# Patient Record
Sex: FEMALE | Race: WHITE | NOT HISPANIC OR LATINO | Employment: FULL TIME | URBAN - METROPOLITAN AREA
[De-identification: names, ages, dates, MRNs, and addresses within clinical notes are randomized per-mention and may not be internally consistent; named-entity substitution may affect disease eponyms.]

---

## 2018-06-04 ENCOUNTER — HOSPITAL ENCOUNTER (OUTPATIENT)
Dept: RADIOLOGY | Facility: MEDICAL CENTER | Age: 58
End: 2018-06-04

## 2018-06-04 ENCOUNTER — APPOINTMENT (OUTPATIENT)
Dept: RADIOLOGY | Facility: MEDICAL CENTER | Age: 58
DRG: 563 | End: 2018-06-04
Attending: ORTHOPAEDIC SURGERY
Payer: COMMERCIAL

## 2018-06-04 ENCOUNTER — HOSPITAL ENCOUNTER (INPATIENT)
Facility: MEDICAL CENTER | Age: 58
LOS: 1 days | DRG: 563 | End: 2018-06-05
Attending: EMERGENCY MEDICINE | Admitting: INTERNAL MEDICINE
Payer: COMMERCIAL

## 2018-06-04 ENCOUNTER — APPOINTMENT (OUTPATIENT)
Dept: RADIOLOGY | Facility: MEDICAL CENTER | Age: 58
DRG: 563 | End: 2018-06-04
Attending: EMERGENCY MEDICINE
Payer: COMMERCIAL

## 2018-06-04 DIAGNOSIS — S42.211A CLOSED DISPLACED FRACTURE OF SURGICAL NECK OF RIGHT HUMERUS, UNSPECIFIED FRACTURE MORPHOLOGY, INITIAL ENCOUNTER: ICD-10-CM

## 2018-06-04 DIAGNOSIS — S42.291A OTHER CLOSED DISPLACED FRACTURE OF PROXIMAL END OF RIGHT HUMERUS, INITIAL ENCOUNTER: ICD-10-CM

## 2018-06-04 PROBLEM — S42.301A DISPLACED FRACTURE OF RIGHT HUMERUS: Status: ACTIVE | Noted: 2018-06-04

## 2018-06-04 PROBLEM — F10.10 ALCOHOL ABUSE: Status: ACTIVE | Noted: 2018-06-04

## 2018-06-04 PROBLEM — R74.01 TRANSAMINITIS: Status: ACTIVE | Noted: 2018-06-04

## 2018-06-04 PROBLEM — E66.9 OBESE: Status: ACTIVE | Noted: 2018-06-04

## 2018-06-04 LAB
ALBUMIN SERPL BCP-MCNC: 3.9 G/DL (ref 3.2–4.9)
ALBUMIN/GLOB SERPL: 1.5 G/DL
ALP SERPL-CCNC: 107 U/L (ref 30–99)
ALT SERPL-CCNC: 65 U/L (ref 2–50)
ANION GAP SERPL CALC-SCNC: 12 MMOL/L (ref 0–11.9)
APTT PPP: 27.6 SEC (ref 24.7–36)
AST SERPL-CCNC: 225 U/L (ref 12–45)
BASOPHILS # BLD AUTO: 0.3 % (ref 0–1.8)
BASOPHILS # BLD: 0.02 K/UL (ref 0–0.12)
BILIRUB SERPL-MCNC: 0.8 MG/DL (ref 0.1–1.5)
BUN SERPL-MCNC: 10 MG/DL (ref 8–22)
CALCIUM SERPL-MCNC: 8.3 MG/DL (ref 8.5–10.5)
CHLORIDE SERPL-SCNC: 106 MMOL/L (ref 96–112)
CO2 SERPL-SCNC: 18 MMOL/L (ref 20–33)
CREAT SERPL-MCNC: 0.54 MG/DL (ref 0.5–1.4)
EOSINOPHIL # BLD AUTO: 0 K/UL (ref 0–0.51)
EOSINOPHIL NFR BLD: 0 % (ref 0–6.9)
ERYTHROCYTE [DISTWIDTH] IN BLOOD BY AUTOMATED COUNT: 46.1 FL (ref 35.9–50)
GLOBULIN SER CALC-MCNC: 2.6 G/DL (ref 1.9–3.5)
GLUCOSE SERPL-MCNC: 126 MG/DL (ref 65–99)
HCT VFR BLD AUTO: 36.8 % (ref 37–47)
HGB BLD-MCNC: 12.3 G/DL (ref 12–16)
IMM GRANULOCYTES # BLD AUTO: 0.03 K/UL (ref 0–0.11)
IMM GRANULOCYTES NFR BLD AUTO: 0.4 % (ref 0–0.9)
INR PPP: 1.05 (ref 0.87–1.13)
LYMPHOCYTES # BLD AUTO: 0.83 K/UL (ref 1–4.8)
LYMPHOCYTES NFR BLD: 11.6 % (ref 22–41)
MAGNESIUM SERPL-MCNC: 1.7 MG/DL (ref 1.5–2.5)
MCH RBC QN AUTO: 32 PG (ref 27–33)
MCHC RBC AUTO-ENTMCNC: 33.4 G/DL (ref 33.6–35)
MCV RBC AUTO: 95.8 FL (ref 81.4–97.8)
MONOCYTES # BLD AUTO: 0.42 K/UL (ref 0–0.85)
MONOCYTES NFR BLD AUTO: 5.9 % (ref 0–13.4)
NEUTROPHILS # BLD AUTO: 5.86 K/UL (ref 2–7.15)
NEUTROPHILS NFR BLD: 81.8 % (ref 44–72)
NRBC # BLD AUTO: 0 K/UL
NRBC BLD-RTO: 0 /100 WBC
PHOSPHATE SERPL-MCNC: 3.7 MG/DL (ref 2.5–4.5)
PLATELET # BLD AUTO: 206 K/UL (ref 164–446)
PMV BLD AUTO: 9.9 FL (ref 9–12.9)
POTASSIUM SERPL-SCNC: 3.8 MMOL/L (ref 3.6–5.5)
PROT SERPL-MCNC: 6.5 G/DL (ref 6–8.2)
PROTHROMBIN TIME: 13.4 SEC (ref 12–14.6)
RBC # BLD AUTO: 3.84 M/UL (ref 4.2–5.4)
SODIUM SERPL-SCNC: 136 MMOL/L (ref 135–145)
TSH SERPL DL<=0.005 MIU/L-ACNC: 2.39 UIU/ML (ref 0.38–5.33)
WBC # BLD AUTO: 7.2 K/UL (ref 4.8–10.8)

## 2018-06-04 PROCEDURE — 71045 X-RAY EXAM CHEST 1 VIEW: CPT

## 2018-06-04 PROCEDURE — 84443 ASSAY THYROID STIM HORMONE: CPT

## 2018-06-04 PROCEDURE — 85025 COMPLETE CBC W/AUTO DIFF WBC: CPT

## 2018-06-04 PROCEDURE — 73030 X-RAY EXAM OF SHOULDER: CPT | Mod: RT

## 2018-06-04 PROCEDURE — 85730 THROMBOPLASTIN TIME PARTIAL: CPT

## 2018-06-04 PROCEDURE — 80053 COMPREHEN METABOLIC PANEL: CPT

## 2018-06-04 PROCEDURE — A9270 NON-COVERED ITEM OR SERVICE: HCPCS | Performed by: INTERNAL MEDICINE

## 2018-06-04 PROCEDURE — 700102 HCHG RX REV CODE 250 W/ 637 OVERRIDE(OP): Performed by: INTERNAL MEDICINE

## 2018-06-04 PROCEDURE — 94760 N-INVAS EAR/PLS OXIMETRY 1: CPT

## 2018-06-04 PROCEDURE — 770006 HCHG ROOM/CARE - MED/SURG/GYN SEMI*

## 2018-06-04 PROCEDURE — HZ2ZZZZ DETOXIFICATION SERVICES FOR SUBSTANCE ABUSE TREATMENT: ICD-10-PCS | Performed by: INTERNAL MEDICINE

## 2018-06-04 PROCEDURE — 96374 THER/PROPH/DIAG INJ IV PUSH: CPT

## 2018-06-04 PROCEDURE — 85610 PROTHROMBIN TIME: CPT

## 2018-06-04 PROCEDURE — 700105 HCHG RX REV CODE 258: Performed by: INTERNAL MEDICINE

## 2018-06-04 PROCEDURE — 84100 ASSAY OF PHOSPHORUS: CPT

## 2018-06-04 PROCEDURE — A9270 NON-COVERED ITEM OR SERVICE: HCPCS | Performed by: ORTHOPAEDIC SURGERY

## 2018-06-04 PROCEDURE — 700102 HCHG RX REV CODE 250 W/ 637 OVERRIDE(OP): Performed by: ORTHOPAEDIC SURGERY

## 2018-06-04 PROCEDURE — 99285 EMERGENCY DEPT VISIT HI MDM: CPT

## 2018-06-04 PROCEDURE — 83735 ASSAY OF MAGNESIUM: CPT

## 2018-06-04 PROCEDURE — 36415 COLL VENOUS BLD VENIPUNCTURE: CPT

## 2018-06-04 PROCEDURE — 99223 1ST HOSP IP/OBS HIGH 75: CPT | Performed by: INTERNAL MEDICINE

## 2018-06-04 PROCEDURE — 700111 HCHG RX REV CODE 636 W/ 250 OVERRIDE (IP): Performed by: INTERNAL MEDICINE

## 2018-06-04 PROCEDURE — 93005 ELECTROCARDIOGRAM TRACING: CPT | Performed by: EMERGENCY MEDICINE

## 2018-06-04 RX ORDER — LORAZEPAM 1 MG/1
2 TABLET ORAL
Status: DISCONTINUED | OUTPATIENT
Start: 2018-06-04 | End: 2018-06-05 | Stop reason: HOSPADM

## 2018-06-04 RX ORDER — FOLIC ACID 1 MG/1
1 TABLET ORAL DAILY
Status: DISCONTINUED | OUTPATIENT
Start: 2018-06-05 | End: 2018-06-05 | Stop reason: HOSPADM

## 2018-06-04 RX ORDER — LORAZEPAM 1 MG/1
3 TABLET ORAL
Status: DISCONTINUED | OUTPATIENT
Start: 2018-06-04 | End: 2018-06-05 | Stop reason: HOSPADM

## 2018-06-04 RX ORDER — SODIUM CHLORIDE 9 MG/ML
INJECTION, SOLUTION INTRAVENOUS CONTINUOUS
Status: DISCONTINUED | OUTPATIENT
Start: 2018-06-04 | End: 2018-06-05 | Stop reason: HOSPADM

## 2018-06-04 RX ORDER — POLYETHYLENE GLYCOL 3350 17 G/17G
1 POWDER, FOR SOLUTION ORAL
Status: DISCONTINUED | OUTPATIENT
Start: 2018-06-04 | End: 2018-06-05 | Stop reason: HOSPADM

## 2018-06-04 RX ORDER — PROMETHAZINE HYDROCHLORIDE 25 MG/1
12.5-25 SUPPOSITORY RECTAL EVERY 4 HOURS PRN
Status: DISCONTINUED | OUTPATIENT
Start: 2018-06-04 | End: 2018-06-05 | Stop reason: HOSPADM

## 2018-06-04 RX ORDER — LORAZEPAM 1 MG/1
4 TABLET ORAL
Status: DISCONTINUED | OUTPATIENT
Start: 2018-06-04 | End: 2018-06-05 | Stop reason: HOSPADM

## 2018-06-04 RX ORDER — TRAMADOL HYDROCHLORIDE 50 MG/1
25 TABLET ORAL EVERY 6 HOURS PRN
Status: DISCONTINUED | OUTPATIENT
Start: 2018-06-04 | End: 2018-06-05

## 2018-06-04 RX ORDER — PROMETHAZINE HYDROCHLORIDE 25 MG/1
12.5-25 TABLET ORAL EVERY 4 HOURS PRN
Status: DISCONTINUED | OUTPATIENT
Start: 2018-06-04 | End: 2018-06-05 | Stop reason: HOSPADM

## 2018-06-04 RX ORDER — ONDANSETRON 4 MG/1
4 TABLET, ORALLY DISINTEGRATING ORAL EVERY 4 HOURS PRN
Status: DISCONTINUED | OUTPATIENT
Start: 2018-06-04 | End: 2018-06-05 | Stop reason: HOSPADM

## 2018-06-04 RX ORDER — LORAZEPAM 2 MG/ML
1 INJECTION INTRAMUSCULAR
Status: DISCONTINUED | OUTPATIENT
Start: 2018-06-04 | End: 2018-06-05 | Stop reason: HOSPADM

## 2018-06-04 RX ORDER — ONDANSETRON 2 MG/ML
4 INJECTION INTRAMUSCULAR; INTRAVENOUS EVERY 4 HOURS PRN
Status: DISCONTINUED | OUTPATIENT
Start: 2018-06-04 | End: 2018-06-05 | Stop reason: HOSPADM

## 2018-06-04 RX ORDER — LORAZEPAM 1 MG/1
1 TABLET ORAL EVERY 4 HOURS PRN
Status: DISCONTINUED | OUTPATIENT
Start: 2018-06-04 | End: 2018-06-05 | Stop reason: HOSPADM

## 2018-06-04 RX ORDER — LORAZEPAM 2 MG/ML
1.5 INJECTION INTRAMUSCULAR
Status: DISCONTINUED | OUTPATIENT
Start: 2018-06-04 | End: 2018-06-05 | Stop reason: HOSPADM

## 2018-06-04 RX ORDER — THIAMINE MONONITRATE (VIT B1) 100 MG
100 TABLET ORAL DAILY
Status: DISCONTINUED | OUTPATIENT
Start: 2018-06-05 | End: 2018-06-05 | Stop reason: HOSPADM

## 2018-06-04 RX ORDER — ACETAMINOPHEN 325 MG/1
650 TABLET ORAL EVERY 6 HOURS PRN
Status: DISCONTINUED | OUTPATIENT
Start: 2018-06-04 | End: 2018-06-05 | Stop reason: HOSPADM

## 2018-06-04 RX ORDER — LORAZEPAM 2 MG/ML
0.5 INJECTION INTRAMUSCULAR EVERY 4 HOURS PRN
Status: DISCONTINUED | OUTPATIENT
Start: 2018-06-04 | End: 2018-06-05 | Stop reason: HOSPADM

## 2018-06-04 RX ORDER — OXYCODONE HYDROCHLORIDE 10 MG/1
10 TABLET ORAL
Status: DISCONTINUED | OUTPATIENT
Start: 2018-06-04 | End: 2018-06-04

## 2018-06-04 RX ORDER — OMEPRAZOLE 20 MG/1
20 CAPSULE, DELAYED RELEASE ORAL DAILY
Status: DISCONTINUED | OUTPATIENT
Start: 2018-06-04 | End: 2018-06-05 | Stop reason: HOSPADM

## 2018-06-04 RX ORDER — BISACODYL 10 MG
10 SUPPOSITORY, RECTAL RECTAL
Status: DISCONTINUED | OUTPATIENT
Start: 2018-06-04 | End: 2018-06-05 | Stop reason: HOSPADM

## 2018-06-04 RX ORDER — OXYCODONE HYDROCHLORIDE 5 MG/1
5 TABLET ORAL
Status: DISCONTINUED | OUTPATIENT
Start: 2018-06-04 | End: 2018-06-04

## 2018-06-04 RX ORDER — MORPHINE SULFATE 4 MG/ML
4 INJECTION, SOLUTION INTRAMUSCULAR; INTRAVENOUS
Status: DISCONTINUED | OUTPATIENT
Start: 2018-06-04 | End: 2018-06-05 | Stop reason: HOSPADM

## 2018-06-04 RX ORDER — AMOXICILLIN 250 MG
2 CAPSULE ORAL 2 TIMES DAILY
Status: DISCONTINUED | OUTPATIENT
Start: 2018-06-04 | End: 2018-06-05 | Stop reason: HOSPADM

## 2018-06-04 RX ORDER — LORAZEPAM 2 MG/ML
2 INJECTION INTRAMUSCULAR
Status: DISCONTINUED | OUTPATIENT
Start: 2018-06-04 | End: 2018-06-05 | Stop reason: HOSPADM

## 2018-06-04 RX ORDER — LORAZEPAM 1 MG/1
0.5 TABLET ORAL EVERY 4 HOURS PRN
Status: DISCONTINUED | OUTPATIENT
Start: 2018-06-04 | End: 2018-06-05 | Stop reason: HOSPADM

## 2018-06-04 RX ADMIN — SODIUM CHLORIDE: 9 INJECTION, SOLUTION INTRAVENOUS at 10:46

## 2018-06-04 RX ADMIN — SODIUM CHLORIDE: 9 INJECTION, SOLUTION INTRAVENOUS at 19:00

## 2018-06-04 RX ADMIN — PROMETHAZINE HYDROCHLORIDE 25 MG: 25 TABLET ORAL at 20:00

## 2018-06-04 RX ADMIN — MORPHINE SULFATE 4 MG: 4 INJECTION INTRAVENOUS at 10:46

## 2018-06-04 RX ADMIN — OMEPRAZOLE 20 MG: 20 CAPSULE, DELAYED RELEASE ORAL at 12:52

## 2018-06-04 RX ADMIN — ONDANSETRON 4 MG: 2 INJECTION INTRAMUSCULAR; INTRAVENOUS at 12:52

## 2018-06-04 RX ADMIN — MORPHINE SULFATE 4 MG: 4 INJECTION INTRAVENOUS at 16:56

## 2018-06-04 RX ADMIN — MORPHINE SULFATE 4 MG: 4 INJECTION INTRAVENOUS at 07:24

## 2018-06-04 RX ADMIN — PROCHLORPERAZINE EDISYLATE 5 MG: 5 INJECTION INTRAMUSCULAR; INTRAVENOUS at 20:16

## 2018-06-04 ASSESSMENT — LIFESTYLE VARIABLES
VISUAL DISTURBANCES: NOT PRESENT
AUDITORY DISTURBANCES: NOT PRESENT
AGITATION: NORMAL ACTIVITY
PAROXYSMAL SWEATS: NO SWEAT VISIBLE
SUBSTANCE_ABUSE: 1
AVERAGE NUMBER OF DAYS PER WEEK YOU HAVE A DRINK CONTAINING ALCOHOL: 7
ANXIETY: MILDLY ANXIOUS
ON A TYPICAL DAY WHEN YOU DRINK ALCOHOL HOW MANY DRINKS DO YOU HAVE: 1
HOW MANY TIMES IN THE PAST YEAR HAVE YOU HAD 5 OR MORE DRINKS IN A DAY: 0
TOTAL SCORE: 0
PAROXYSMAL SWEATS: NO SWEAT VISIBLE
NAUSEA AND VOMITING: NO NAUSEA AND NO VOMITING
NAUSEA AND VOMITING: NO NAUSEA AND NO VOMITING
HEADACHE, FULLNESS IN HEAD: NOT PRESENT
HAVE YOU EVER FELT YOU SHOULD CUT DOWN ON YOUR DRINKING: YES
NAUSEA AND VOMITING: NO NAUSEA AND NO VOMITING
HEADACHE, FULLNESS IN HEAD: NOT PRESENT
TOTAL SCORE: 1
TOTAL SCORE: 0
ORIENTATION AND CLOUDING OF SENSORIUM: ORIENTED AND CAN DO SERIAL ADDITIONS
AUDITORY DISTURBANCES: NOT PRESENT
VISUAL DISTURBANCES: NOT PRESENT
AUDITORY DISTURBANCES: NOT PRESENT
AGITATION: NORMAL ACTIVITY
EVER HAD A DRINK FIRST THING IN THE MORNING TO STEADY YOUR NERVES TO GET RID OF A HANGOVER: NO
TOTAL SCORE: 1
HEADACHE, FULLNESS IN HEAD: NOT PRESENT
AGITATION: NORMAL ACTIVITY
EVER_SMOKED: NEVER
PAROXYSMAL SWEATS: NO SWEAT VISIBLE
TREMOR: NO TREMOR
TOTAL SCORE: 1
AGITATION: NORMAL ACTIVITY
ALCOHOL_USE: YES
TOTAL SCORE: 1
ORIENTATION AND CLOUDING OF SENSORIUM: ORIENTED AND CAN DO SERIAL ADDITIONS
TREMOR: NO TREMOR
HAVE PEOPLE ANNOYED YOU BY CRITICIZING YOUR DRINKING: NO
ANXIETY: NO ANXIETY (AT EASE)
EVER FELT BAD OR GUILTY ABOUT YOUR DRINKING: NO
ORIENTATION AND CLOUDING OF SENSORIUM: ORIENTED AND CAN DO SERIAL ADDITIONS
VISUAL DISTURBANCES: NOT PRESENT
ANXIETY: MILDLY ANXIOUS
TOTAL SCORE: 1
ANXIETY: NO ANXIETY (AT EASE)
TREMOR: NO TREMOR
PAROXYSMAL SWEATS: NO SWEAT VISIBLE
NAUSEA AND VOMITING: NO NAUSEA AND NO VOMITING
HEADACHE, FULLNESS IN HEAD: NOT PRESENT
ORIENTATION AND CLOUDING OF SENSORIUM: ORIENTED AND CAN DO SERIAL ADDITIONS
CONSUMPTION TOTAL: NEGATIVE
AUDITORY DISTURBANCES: NOT PRESENT
TREMOR: NO TREMOR
VISUAL DISTURBANCES: NOT PRESENT

## 2018-06-04 ASSESSMENT — ENCOUNTER SYMPTOMS
MYALGIAS: 0
DIARRHEA: 0
FEVER: 0
LOSS OF CONSCIOUSNESS: 0
SENSORY CHANGE: 0
DIAPHORESIS: 0
WHEEZING: 0
ABDOMINAL PAIN: 0
SPUTUM PRODUCTION: 0
NECK PAIN: 0
FLANK PAIN: 0
SPEECH CHANGE: 0
SORE THROAT: 0
BRUISES/BLEEDS EASILY: 0
FALLS: 1
SEIZURES: 0
PALPITATIONS: 0
FOCAL WEAKNESS: 0
NERVOUS/ANXIOUS: 1
NAUSEA: 0
SHORTNESS OF BREATH: 0
VOMITING: 0
TINGLING: 0
CHILLS: 0
DEPRESSION: 0
BLURRED VISION: 0
DIZZINESS: 0
BACK PAIN: 0
COUGH: 0
HEADACHES: 0
BLOOD IN STOOL: 0

## 2018-06-04 ASSESSMENT — PAIN SCALES - GENERAL
PAINLEVEL_OUTOF10: 7
PAINLEVEL_OUTOF10: 4
PAINLEVEL_OUTOF10: 4
PAINLEVEL_OUTOF10: 7
PAINLEVEL_OUTOF10: 5
PAINLEVEL_OUTOF10: 5
PAINLEVEL_OUTOF10: 10
PAINLEVEL_OUTOF10: 8
PAINLEVEL_OUTOF10: 4

## 2018-06-04 ASSESSMENT — COPD QUESTIONNAIRES
COPD SCREENING SCORE: 1
HAVE YOU SMOKED AT LEAST 100 CIGARETTES IN YOUR ENTIRE LIFE: NO/DON'T KNOW
DURING THE PAST 4 WEEKS HOW MUCH DID YOU FEEL SHORT OF BREATH: NONE/LITTLE OF THE TIME
DO YOU EVER COUGH UP ANY MUCUS OR PHLEGM?: NO/ONLY WITH OCCASIONAL COLDS OR INFECTIONS

## 2018-06-04 ASSESSMENT — PAIN SCALES - WONG BAKER: WONGBAKER_NUMERICALRESPONSE: HURTS JUST A LITTLE BIT

## 2018-06-04 NOTE — PROGRESS NOTES
Dual RN Skin check completed at bedside.  Small bruise to left butt from fall.  Sling remains in place to RUE. Skin other wise intact.

## 2018-06-04 NOTE — PROGRESS NOTES
Patient seen & examined  Xrays reviewed  Comfortable in sling    Vitals:    06/04/18 0800 06/04/18 0830 06/04/18 0900 06/04/18 1053   BP:    146/83   Pulse: 76 71 94 75   Resp:    15   Temp:    37.1 °C (98.7 °F)   SpO2: 97% 96% 94% 96%   Weight:       Height:         RUE:  Sling in place  f/e wrist, digits   Sensation preserved throughout including axillary distribution  Hand w/w/p    R proximal humerus fracture    - Discussed treatment options with the patient.  We recommend non-operative management.  Can eat today  - NWB RUE in sling for comfort, OK for elbow, wrist, digit ROM.  Will start shoulder pendulums next week  - PT/OT evaluation  - pain control - patient states does better with liquid medications  - Outpatient follow-up in 1 week at [facility] with either Dr Barreto or Dr Pond

## 2018-06-04 NOTE — ASSESSMENT & PLAN NOTE
Orthopedics has been consulted  Patient is low risk for surgery, no further testing necessary  Nothing by mouth  Pain control with oxycodone and IV morphine, monitor respiratory status closely  PTOT

## 2018-06-04 NOTE — ASSESSMENT & PLAN NOTE
Patient has been counseled on diet and lifestyle modifications  Recommended outpatient weight management program and bariatric surgery evaluation

## 2018-06-04 NOTE — ED NOTES
Med rec complete per pt at bedside   Pt denies taking OTC and prescription medications   Allergies reviewed  No oral ABX within last 30 days

## 2018-06-04 NOTE — H&P
Hospital Medicine History and Physical    Date of Service  6/4/2018    Chief Complaint  Chief Complaint   Patient presents with   • Arm Injury     RT arm.        History of Presenting Illness  57 y.o. female who presented 6/4/2018 with ground-level fall and right shoulder pain. Patient states that she was drunk last night and while walking over some steps she tripped and injured her right shoulder. She has severe right shoulder pain. Pain is worse with any sort of movement. She denies any chest pain, lightheadedness or shortness of breath. She presented to an outlying facility where she underwent an x-ray that revealed a displaced comminuted right humeral fracture. Orthopedics was consulted by the ER physician. She reports drinking 5-6 drinks of hard liquor per day.       Primary Care Physician  YOEL Burks.P.NAYA.    Consultants  Ortho Dr Burch    Code Status  Full Code    Review of Systems  Review of Systems   Constitutional: Negative for chills, diaphoresis and fever.   HENT: Negative for hearing loss and sore throat.    Eyes: Negative for blurred vision.   Respiratory: Negative for cough, sputum production, shortness of breath and wheezing.    Cardiovascular: Negative for chest pain, palpitations and leg swelling.   Gastrointestinal: Negative for abdominal pain, blood in stool, diarrhea, nausea and vomiting.   Genitourinary: Negative for dysuria, flank pain and urgency.   Musculoskeletal: Positive for falls and joint pain. Negative for back pain, myalgias and neck pain.   Skin: Negative for rash.   Neurological: Negative for dizziness, tingling, sensory change, speech change, focal weakness, seizures, loss of consciousness and headaches.   Endo/Heme/Allergies: Does not bruise/bleed easily.   Psychiatric/Behavioral: Positive for substance abuse. Negative for depression and suicidal ideas. The patient is nervous/anxious.    All other systems reviewed and are negative.       Past Medical History  Past  Medical History:   Diagnosis Date   • Hypoglycemia        Surgical History  Past Surgical History:   Procedure Laterality Date   • BREAST RECONSTRUCTION Bilateral 2012    Procedure: WITH REPOSITIONING OF IMFRAMMARY FOLDS;  Surgeon: Hilario Wilhelm M.D.;  Location: [facility];  Service:    • ABDOMINOPLASTY N/A 2012    Procedure: REVERSE ;  Surgeon: Hilario Wilhelm M.D.;  Location: [facility];  Service:    • BREAST IMPLANT REVISION Bilateral 2012    Procedure: EXCHANGE TO LARGER SALINE IMPLANTS;  Surgeon: Hilario Wilhelm M.D.;  Location: [facility];  Service:    • CAPSULECTOMY Bilateral 2012    Procedure: CAPSULECTOMY;  Surgeon: Hilario Wilhelm M.D.;  Location: [facility];  Service:    • PB  DELIVERY ONLY     • CHOLECYSTECTOMY     • OTHER      cosmetic arms, legs, breast, stomach   • OTHER      ablation (uterine)       Medications  No current facility-administered medications on file prior to encounter.      Current Outpatient Prescriptions on File Prior to Encounter   Medication Sig Dispense Refill   • IRON PO Take 85 mg by mouth every day.     • Calcium Carbonate-Vitamin D (CALCIUM + D PO) Take 200 mg by mouth every day.     • magnesium gluconate 556 mg (ELEMENTAL MAG = 30 MG) 30 MG TABS Take 200 mg by mouth every day.         Family History  Family History   Problem Relation Age of Onset   • Diabetes     • Stroke         Social History  Social History   Substance Use Topics   • Smoking status: Never Smoker   • Smokeless tobacco: Never Used   • Alcohol use No       Allergies  Allergies   Allergen Reactions   • Bloodless    • Codeine         Physical Exam  Laboratory   Hemodynamics  No data recorded.      Pulse  Av.3  Min: 70  Max: 90    NIBP: 107/61      Respiratory      Pulse Oximetry: 94 %             Physical Exam   Constitutional: She is oriented to person, place, and time. She appears well-developed and  well-nourished. She appears distressed.   Obese   HENT:   Head: Normocephalic and atraumatic.   Mouth/Throat: Oropharynx is clear and moist.   Eyes: Conjunctivae are normal. Pupils are equal, round, and reactive to light.   Neck: Neck supple.   Cardiovascular: Regular rhythm and normal heart sounds.    Tachycardic   Pulmonary/Chest: Effort normal and breath sounds normal. No respiratory distress. She has no wheezes. She has no rales.   Abdominal: Soft. Bowel sounds are normal. She exhibits no distension. There is no tenderness. There is no rebound.   Musculoskeletal: She exhibits tenderness. She exhibits no edema.   Right arm in sling  Limited range of motion due to pain  Able to move fingers  Distal pulses intact   Neurological: She is alert and oriented to person, place, and time. No cranial nerve deficit. Coordination normal.   Skin: Skin is warm and dry.   Psychiatric: Her mood appears anxious.   Nursing note and vitals reviewed.      Recent Labs      06/04/18   0316   WBC  7.2   RBC  3.84*   HEMOGLOBIN  12.3   HEMATOCRIT  36.8*   MCV  95.8   MCH  32.0   MCHC  33.4*   RDW  46.1   PLATELETCT  206   MPV  9.9     Recent Labs      06/04/18   0316   SODIUM  136   POTASSIUM  3.8   CHLORIDE  106   CO2  18*   GLUCOSE  126*   BUN  10   CREATININE  0.54   CALCIUM  8.3*     Recent Labs      06/04/18   0316   ALTSGPT  65*   ASTSGOT  225*   ALKPHOSPHAT  107*   TBILIRUBIN  0.8   GLUCOSE  126*     Recent Labs      06/04/18   0316   APTT  27.6   INR  1.05             No results found for: TROPONINI  Urinalysis:  No results found for: SPECGRAVITY, GLUCOSEUR, KETONES, NITRITE, WBCURINE, RBCURINE, BACTERIA, EPITHELCELL     Imaging  DX-CHEST-PORTABLE (1 VIEW)   Final Result         1. Cardiomegaly. No pulmonary infiltrates or consolidations are noted.      2. Partially visualized right humerus fracture.      AS-EDODINJ-HMWEIAQ FILM X-RAY   Final Result      DX-SHOULDER 2+ RIGHT    (Results Pending)        Assessment/Plan     I  anticipate this patient will require at least two midnights for appropriate medical management, necessitating inpatient admission.    Displaced fracture of right humerus- (present on admission)   Assessment & Plan    Orthopedics has been consulted  Patient is low risk for surgery, no further testing necessary  Nothing by mouth  Pain control with oxycodone and IV morphine, monitor respiratory status closely  PTOT        Obese   Assessment & Plan    Patient has been counseled on diet and lifestyle modifications  Recommended outpatient weight management program and bariatric surgery evaluation          Alcohol abuse- (present on admission)   Assessment & Plan    Patient has been counseled on alcohol cessation  She is at risk for alcohol withdrawal  She will be started on CIWA protocol  She'll be started on rally bag, multivitamin, thiamine and folate  Replete electrolytes            Transaminitis- (present on admission)   Assessment & Plan    Secondary to alcohol-related liver injury  Patient has been counseled extensively on alcohol cessation and has intent to quit  Monitor CMP            VTE prophylaxis: SCD.

## 2018-06-04 NOTE — H&P
Orthopaedic Consult / H&P Note  Date: 18  Time: 620      CHIEF COMPLAINT: right humerus fracture    HISTORY OF PRESENT ILLNESS: Deja Elias is a 57 y.o. who presents with a right displaced humerus fracture following a ground level fall after hitting a closet door at home. Had immediate pain and was taken to [place] ER where she was found to have a displaced proximal humerus fracture. Transferred to [facility] for evaluation. Denies head trauma or LOC. Denies DM; patient is right handed. No back pain or radicular symptoms    Past Medical History:   Diagnosis Date   • Hypoglycemia        Past Surgical History:   Procedure Laterality Date   • BREAST RECONSTRUCTION Bilateral 2012    Procedure: WITH REPOSITIONING OF IMFRAMMARY FOLDS;  Surgeon: Hilario Wilhelm M.D.;  Location: [facility];  Service:    • ABDOMINOPLASTY N/A 2012    Procedure: REVERSE ;  Surgeon: Hilario Wilhelm M.D.;  Location: [facility];  Service:    • BREAST IMPLANT REVISION Bilateral 2012    Procedure: EXCHANGE TO LARGER SALINE IMPLANTS;  Surgeon: Hilario Wilhelm M.D.;  Location: [facility];  Service:    • CAPSULECTOMY Bilateral 2012    Procedure: CAPSULECTOMY;  Surgeon: Hilario Wilhelm M.D.;  Location: [facility];  Service:    • PB  DELIVERY ONLY     • CHOLECYSTECTOMY     • OTHER      cosmetic arms, legs, breast, stomach   • OTHER      ablation (uterine)       No current facility-administered medications on file prior to encounter.      Current Outpatient Prescriptions on File Prior to Encounter   Medication Sig Dispense Refill   • IRON PO Take 85 mg by mouth every day.     • Calcium Carbonate-Vitamin D (CALCIUM + D PO) Take 200 mg by mouth every day.     • magnesium gluconate 556 mg (ELEMENTAL MAG = 30 MG) 30 MG TABS Take 200 mg by mouth every day.         Allergies: Bloodless and Codeine    Social History     Social History   • Marital status:       Spouse name: N/A   • Number of children: N/A   • Years of education: N/A     Occupational History   • Not on file.     Social History Main Topics   • Smoking status: Never Smoker   • Smokeless tobacco: Never Used   • Alcohol use No   • Drug use: No   • Sexual activity: Not on file     Other Topics Concern   • Not on file     Social History Narrative   • No narrative on file       Family History   Problem Relation Age of Onset   • Diabetes     • Stroke         REVIEW OF SYSTEMS: Full 13-point review of systems obtained with positives   noted in the HPI above, all others negative.    PHYSICAL EXAMINATION:  Vitals:    06/04/18 0300 06/04/18 0330 06/04/18 0400 06/04/18 0442   Pulse: 78 70 81 77   SpO2: 93% 95% 92% 94%   Weight:       Height:           GENERAL: No acute distress, alert and oriented x3.  HEENT: Normocephalic, atraumatic  CARDIOVASCULAR: Regular rate  RESPIRATORY: Unlabored  ABDOMEN: Soft, nontender, nondistended.  SKIN: Some swelling/bruising, no open wound noted.   EXTREMITIES: No clubbing, cyanosis or edema, Pain in right arm with movement, other extremities have no pain with palpation, including no pain in right elbow.   MUSCULOSKELETAL: +finger flex/extend/oppose/abduct/adduct; +wrist flex/extend; SILT M/R/U/axillary    LABORATORY DATA:     Lab Results   Component Value Date/Time    WBC 7.2 06/04/2018 03:16 AM    RBC 3.84 (L) 06/04/2018 03:16 AM    HEMOGLOBIN 12.3 06/04/2018 03:16 AM    HEMATOCRIT 36.8 (L) 06/04/2018 03:16 AM        Lab Results   Component Value Date/Time    SODIUM 136 06/04/2018 03:16 AM    POTASSIUM 3.8 06/04/2018 03:16 AM    CHLORIDE 106 06/04/2018 03:16 AM    CO2 18 (L) 06/04/2018 03:16 AM    GLUCOSE 126 (H) 06/04/2018 03:16 AM    BUN 10 06/04/2018 03:16 AM    CREATININE 0.54 06/04/2018 03:16 AM        Lab Results   Component Value Date/Time    PROTHROMBTM 13.4 06/04/2018 03:16 AM    INR 1.05 06/04/2018 03:16 AM          IMAGING: outside films show displaced humeral neck  fracture;     ASSESSMENT AND PLAN: 57 year old female with displaced proximal humerus fracture following ground level fall; sent from [place] for operative intervention. Plan ORIF today with Dr. Barreto    Diet: NPO pending surgery  Weight Bearing Status-NWB RUE, keep arm in sling  DVT Prophylaxis- TEDS/SCDs, chemical held pending surgery  Antibiotics: indicated perioperatively  PT/OT  Case Coordination        Brigido Burch MD  Orthopaedic Surgeon  [facility]      Please page or call with any questions or concerns regarding this patient's care

## 2018-06-04 NOTE — PROGRESS NOTES
"Pt arrived to floor from ED. Pt A+Ox4, able to make needs known, PIV to LFA Patent. Pain 8/10 to RUE, sling in place and ice pack.   CSMT's and ARNOLD's WNL, SCD\"s inplace, Ice pack applied.  Educated pt on call light and TV remote and Incentive Spirometer 1000ml.  Mother Julia at bedside. Pt remains NPO, pt compliant with orders.  "

## 2018-06-04 NOTE — ED PROVIDER NOTES
CHIEF COMPLAINT  Chief Complaint   Patient presents with   • Arm Injury     RT arm.        HPI  Deja Elias is a 57 y.o. female who presents in transfer from [facility] for complicated humerus fracture which was reported to need surgical intervention.  Patient arrives appearing groggy but was able to relate that she took a ground-level fall when reaching up to a cabinet.  She does note that she had several drinks tonight as well.  She denies any other injuries    REVIEW OF SYSTEMS  Constitutional: No recent fevers or chills  Skin: No rashes, sun burn to lower extremities anterior surface  HEENT: No sore throat, runny nose, sores, trouble swallowing, trouble speaking.  Neck: No neck pain, stiffness, or masses.  Chest: No pain or rashes  Pulm: No shortness of breath, cough, wheezing, stridor, or pain with inspiration/expiration  Gastrointestinal: No diarrhea, constipation, bloating, melena, hematochezia or pain.  Genitourinary: No pain, urgency, frequency, dysuria, hematuria, or polyuria.   Musculoskeletal: Right shoulder pain  Neurologic: No sensory or motor changes. No confusion or disorientation.  Heme: No bleeding or bruising problems.   Immuno: No hx of recurrent infections      PAST MEDICAL HISTORY   has a past medical history of Hypoglycemia.    SOCIAL HISTORY  Social History     Social History Main Topics   • Smoking status: Never Smoker   • Smokeless tobacco: Never Used   • Alcohol use No   • Drug use: No   • Sexual activity: Not on file       SURGICAL HISTORY   has a past surgical history that includes other;  delivery only (); cholecystectomy; other; breast reconstruction (Bilateral, 2012); abdominoplasty (N/A, 2012); breast implant revision (Bilateral, 2012); and capsulectomy (Bilateral, 2012).    CURRENT MEDICATIONS  Home Medications    **Home medications have not yet been reviewed for this encounter**         ALLERGIES  Allergies   Allergen Reactions   •  "Bloodless    • Codeine        PHYSICAL EXAM  VITAL SIGNS: Pulse 70   Ht 1.6 m (5' 3\")   Wt 103 kg (227 lb)   SpO2 95%   BMI 40.21 kg/m²    Gen: Appears tired, no apparent distress, morbidly elevated body mass index  HEENT: No signs of trauma, Bilateral external ears normal, Nose normal. Conjunctiva normal, Non-icteric.   Cardiovascular: Regular rate and rhythm, no murmurs.   Thorax & Lungs: Normal breath sounds, No respiratory distress, No wheezing bilateral chest rise  Abdomen: Bowel sounds normal, Soft, No tenderness  Skin: Warm, Dry, No erythema, No rash.   Extremities: Intact distal pulses, No edema, tenderness to right shoulder, patient in shoulder immobilizing splint and wrapped.  I did not attempt to move the patient's arm due to reported injury.  Patient's hand and fingers were warm and dry.  Capillary refill was less than 3 seconds and equal to the left hand.  2+ distal pulses to radial arteries.  Patient able to wiggle fingers on affected side  Neurologic: Alert , no facial droop  Psychiatric: Affect normal, Judgment normal, Mood normal.       DIAGNOSTIC STUDIES / PROCEDURES        LABS  Results for orders placed or performed during the hospital encounter of 06/04/18   CBC WITH DIFFERENTIAL   Result Value Ref Range    WBC 7.2 4.8 - 10.8 K/uL    RBC 3.84 (L) 4.20 - 5.40 M/uL    Hemoglobin 12.3 12.0 - 16.0 g/dL    Hematocrit 36.8 (L) 37.0 - 47.0 %    MCV 95.8 81.4 - 97.8 fL    MCH 32.0 27.0 - 33.0 pg    MCHC 33.4 (L) 33.6 - 35.0 g/dL    RDW 46.1 35.9 - 50.0 fL    Platelet Count 206 164 - 446 K/uL    MPV 9.9 9.0 - 12.9 fL    Neutrophils-Polys 81.80 (H) 44.00 - 72.00 %    Lymphocytes 11.60 (L) 22.00 - 41.00 %    Monocytes 5.90 0.00 - 13.40 %    Eosinophils 0.00 0.00 - 6.90 %    Basophils 0.30 0.00 - 1.80 %    Immature Granulocytes 0.40 0.00 - 0.90 %    Nucleated RBC 0.00 /100 WBC    Neutrophils (Absolute) 5.86 2.00 - 7.15 K/uL    Lymphs (Absolute) 0.83 (L) 1.00 - 4.80 K/uL    Monos (Absolute) 0.42 0.00 - " 0.85 K/uL    Eos (Absolute) 0.00 0.00 - 0.51 K/uL    Baso (Absolute) 0.02 0.00 - 0.12 K/uL    Immature Granulocytes (abs) 0.03 0.00 - 0.11 K/uL    NRBC (Absolute) 0.00 K/uL   COMP METABOLIC PANEL   Result Value Ref Range    Sodium 136 135 - 145 mmol/L    Potassium 3.8 3.6 - 5.5 mmol/L    Chloride 106 96 - 112 mmol/L    Co2 18 (L) 20 - 33 mmol/L    Anion Gap 12.0 (H) 0.0 - 11.9    Glucose 126 (H) 65 - 99 mg/dL    Bun 10 8 - 22 mg/dL    Creatinine 0.54 0.50 - 1.40 mg/dL    Calcium 8.3 (L) 8.5 - 10.5 mg/dL    AST(SGOT) 225 (H) 12 - 45 U/L    ALT(SGPT) 65 (H) 2 - 50 U/L    Alkaline Phosphatase 107 (H) 30 - 99 U/L    Total Bilirubin 0.8 0.1 - 1.5 mg/dL    Albumin 3.9 3.2 - 4.9 g/dL    Total Protein 6.5 6.0 - 8.2 g/dL    Globulin 2.6 1.9 - 3.5 g/dL    A-G Ratio 1.5 g/dL   APTT   Result Value Ref Range    APTT 27.6 24.7 - 36.0 sec   PROTHROMBIN TIME (INR)   Result Value Ref Range    PT 13.4 12.0 - 14.6 sec    INR 1.05 0.87 - 1.13   ESTIMATED GFR   Result Value Ref Range    GFR If African American >60 >60 mL/min/1.73 m 2    GFR If Non African American >60 >60 mL/min/1.73 m 2   EKG (NOW)   Result Value Ref Range    Report       [facility]    Test Date:  2018  Pt Name:    HUMBLE CARLOS                  Department: ER  MRN:        [id number]                      Room:       BL 21  Gender:     Female                       Technician: [id number]  :        [id number]                   Requested By:JAMIN MARTELL  Order #:    [id number]                    Reading MD:    Measurements  Intervals                                Axis  Rate:       76                           P:          46  WV:         188                          QRS:        6  QRSD:       88                           T:          11  QT:         400  QTc:        450    Interpretive Statements  SINUS RHYTHM  BORDERLINE T ABNORMALITIES, ANTERIOR LEADS  No previous ECG available for comparison         RADIOLOGY  DX-CHEST-PORTABLE (1  VIEW)   Final Result         1. Cardiomegaly. No pulmonary infiltrates or consolidations are noted.      2. Partially visualized right humerus fracture.      OK-LBDANDS-[id number] FILM X-RAY   Final Result        Reevaluation   Time:3:00 AM  Vital signs:   Assessment: Patient discussed with the on-call orthopedic surgeon her who asked that the patient be admitted to the hospital service and made n.p.o. for likely surgery tomorrow morning or afternoon.    COURSE & MEDICAL DECISION MAKING  Pertinent Labs & Imaging studies reviewed. (See chart for details)  Patient arrives with a rather comminuted, displaced right proximal humerus fracture after ground-level fall.  This appears to be an isolated injury and the patient is otherwise healthy.  The patient will be admitted to the hospital service for medical treatment and will likely go to the OR later today.  It is notable that the patient's LFTs are abnormal indicating a more chronic alcohol problem.    FINAL IMPRESSION  1.  Right proximal humerus fracture, comminuted, closed  2.  Elevated LFTs  3.         Electronically signed by: Mendez Jim, 6/4/2018 2:30 AM

## 2018-06-04 NOTE — ASSESSMENT & PLAN NOTE
Patient has been counseled on alcohol cessation  She is at risk for alcohol withdrawal  She will be started on CIWA protocol  She'll be started on rally bag, multivitamin, thiamine and folate  Replete electrolytes

## 2018-06-04 NOTE — ED TRIAGE NOTES
Deja Elias  Pt bib EMS. Pt changed into gown and placed on monitor.     Chief Complaint   Patient presents with   • Arm Injury     RT arm.      Pt transferred from [facility] for Ortho consult and higher level of care. Per EMS, pt had mechanical GLF around 2200 last night. Pt c/o right arm and shoulder pain. Pt diagnosed with comminuted fracture of the right surgical neck proximal to the humerus.   -LOC - neck/back pain +CMS.   Chart up and ready for ERP now.

## 2018-06-04 NOTE — ASSESSMENT & PLAN NOTE
Secondary to alcohol-related liver injury  Patient has been counseled extensively on alcohol cessation and has intent to quit  Monitor CMP

## 2018-06-04 NOTE — ED NOTES
Pt request more pain medication, Said it's still working but she's still in some pain, RN explained respiratory depression concern of giving meds too frequently and ordered time frame Q3H. Pt agreed as she needed 2L O2 nasal cannula due to O2 depressed to 85%-88% when pt relaxed.

## 2018-06-05 VITALS
TEMPERATURE: 98.5 F | DIASTOLIC BLOOD PRESSURE: 70 MMHG | RESPIRATION RATE: 16 BRPM | HEART RATE: 68 BPM | OXYGEN SATURATION: 98 % | BODY MASS INDEX: 40.22 KG/M2 | HEIGHT: 63 IN | SYSTOLIC BLOOD PRESSURE: 119 MMHG | WEIGHT: 227 LBS

## 2018-06-05 LAB
BASOPHILS # BLD AUTO: 0.4 % (ref 0–1.8)
BASOPHILS # BLD: 0.02 K/UL (ref 0–0.12)
EOSINOPHIL # BLD AUTO: 0.08 K/UL (ref 0–0.51)
EOSINOPHIL NFR BLD: 1.6 % (ref 0–6.9)
ERYTHROCYTE [DISTWIDTH] IN BLOOD BY AUTOMATED COUNT: 48.6 FL (ref 35.9–50)
HCT VFR BLD AUTO: 35 % (ref 37–47)
HGB BLD-MCNC: 11.2 G/DL (ref 12–16)
IMM GRANULOCYTES # BLD AUTO: 0.01 K/UL (ref 0–0.11)
IMM GRANULOCYTES NFR BLD AUTO: 0.2 % (ref 0–0.9)
LYMPHOCYTES # BLD AUTO: 1.47 K/UL (ref 1–4.8)
LYMPHOCYTES NFR BLD: 29.2 % (ref 22–41)
MCH RBC QN AUTO: 31.6 PG (ref 27–33)
MCHC RBC AUTO-ENTMCNC: 32 G/DL (ref 33.6–35)
MCV RBC AUTO: 98.9 FL (ref 81.4–97.8)
MONOCYTES # BLD AUTO: 0.55 K/UL (ref 0–0.85)
MONOCYTES NFR BLD AUTO: 10.9 % (ref 0–13.4)
NEUTROPHILS # BLD AUTO: 2.91 K/UL (ref 2–7.15)
NEUTROPHILS NFR BLD: 57.7 % (ref 44–72)
NRBC # BLD AUTO: 0 K/UL
NRBC BLD-RTO: 0 /100 WBC
PLATELET # BLD AUTO: 195 K/UL (ref 164–446)
PMV BLD AUTO: 9.7 FL (ref 9–12.9)
RBC # BLD AUTO: 3.54 M/UL (ref 4.2–5.4)
WBC # BLD AUTO: 5 K/UL (ref 4.8–10.8)

## 2018-06-05 PROCEDURE — 700102 HCHG RX REV CODE 250 W/ 637 OVERRIDE(OP): Performed by: ORTHOPAEDIC SURGERY

## 2018-06-05 PROCEDURE — G8979 MOBILITY GOAL STATUS: HCPCS | Mod: CJ

## 2018-06-05 PROCEDURE — 700102 HCHG RX REV CODE 250 W/ 637 OVERRIDE(OP): Performed by: HOSPITALIST

## 2018-06-05 PROCEDURE — 700105 HCHG RX REV CODE 258: Performed by: INTERNAL MEDICINE

## 2018-06-05 PROCEDURE — G8987 SELF CARE CURRENT STATUS: HCPCS | Mod: CJ

## 2018-06-05 PROCEDURE — 700102 HCHG RX REV CODE 250 W/ 637 OVERRIDE(OP): Performed by: INTERNAL MEDICINE

## 2018-06-05 PROCEDURE — G8978 MOBILITY CURRENT STATUS: HCPCS | Mod: CJ

## 2018-06-05 PROCEDURE — 97535 SELF CARE MNGMENT TRAINING: CPT

## 2018-06-05 PROCEDURE — A9270 NON-COVERED ITEM OR SERVICE: HCPCS | Performed by: INTERNAL MEDICINE

## 2018-06-05 PROCEDURE — 36415 COLL VENOUS BLD VENIPUNCTURE: CPT

## 2018-06-05 PROCEDURE — 85025 COMPLETE CBC W/AUTO DIFF WBC: CPT

## 2018-06-05 PROCEDURE — 700111 HCHG RX REV CODE 636 W/ 250 OVERRIDE (IP): Performed by: INTERNAL MEDICINE

## 2018-06-05 PROCEDURE — 97161 PT EVAL LOW COMPLEX 20 MIN: CPT

## 2018-06-05 PROCEDURE — A9270 NON-COVERED ITEM OR SERVICE: HCPCS | Performed by: ORTHOPAEDIC SURGERY

## 2018-06-05 PROCEDURE — G8989 SELF CARE D/C STATUS: HCPCS | Mod: CJ

## 2018-06-05 PROCEDURE — 97165 OT EVAL LOW COMPLEX 30 MIN: CPT

## 2018-06-05 PROCEDURE — A9270 NON-COVERED ITEM OR SERVICE: HCPCS | Performed by: HOSPITALIST

## 2018-06-05 PROCEDURE — 99239 HOSP IP/OBS DSCHRG MGMT >30: CPT | Performed by: HOSPITALIST

## 2018-06-05 PROCEDURE — G8988 SELF CARE GOAL STATUS: HCPCS | Mod: CJ

## 2018-06-05 PROCEDURE — G8980 MOBILITY D/C STATUS: HCPCS | Mod: CJ

## 2018-06-05 RX ORDER — ONDANSETRON 4 MG/1
4 TABLET, FILM COATED ORAL EVERY 4 HOURS PRN
Qty: 20 TAB | Refills: 1 | Status: SHIPPED | OUTPATIENT
Start: 2018-06-05 | End: 2018-06-05

## 2018-06-05 RX ORDER — HYDROCODONE BITARTRATE AND ACETAMINOPHEN 5; 325 MG/1; MG/1
1-2 TABLET ORAL EVERY 6 HOURS PRN
Qty: 40 TAB | Refills: 0 | Status: SHIPPED | OUTPATIENT
Start: 2018-06-05 | End: 2018-06-05

## 2018-06-05 RX ORDER — HYDROCODONE BITARTRATE AND ACETAMINOPHEN 5; 325 MG/1; MG/1
1-2 TABLET ORAL EVERY 6 HOURS PRN
Status: DISCONTINUED | OUTPATIENT
Start: 2018-06-05 | End: 2018-06-05 | Stop reason: HOSPADM

## 2018-06-05 RX ORDER — HYDROCODONE BITARTRATE AND ACETAMINOPHEN 5; 325 MG/1; MG/1
1-2 TABLET ORAL EVERY 6 HOURS PRN
Qty: 40 TAB | Refills: 0 | Status: SHIPPED | OUTPATIENT
Start: 2018-06-05 | End: 2018-06-19

## 2018-06-05 RX ORDER — LANOLIN ALCOHOL/MO/W.PET/CERES
100 CREAM (GRAM) TOPICAL DAILY
Qty: 30 TAB | Refills: 0 | Status: SHIPPED | OUTPATIENT
Start: 2018-06-05 | End: 2024-03-05

## 2018-06-05 RX ORDER — ONDANSETRON 4 MG/1
4 TABLET, FILM COATED ORAL EVERY 4 HOURS PRN
Qty: 20 TAB | Refills: 1 | Status: SHIPPED | OUTPATIENT
Start: 2018-06-05 | End: 2024-03-05

## 2018-06-05 RX ORDER — FOLIC ACID 1 MG/1
1 TABLET ORAL DAILY
Qty: 30 TAB | Refills: 0 | Status: SHIPPED | OUTPATIENT
Start: 2018-06-05 | End: 2024-03-05

## 2018-06-05 RX ADMIN — ONDANSETRON 4 MG: 4 TABLET, ORALLY DISINTEGRATING ORAL at 09:47

## 2018-06-05 RX ADMIN — OMEPRAZOLE 20 MG: 20 CAPSULE, DELAYED RELEASE ORAL at 09:53

## 2018-06-05 RX ADMIN — STANDARDIZED SENNA CONCENTRATE AND DOCUSATE SODIUM 2 TABLET: 8.6; 5 TABLET, FILM COATED ORAL at 09:54

## 2018-06-05 RX ADMIN — THERA TABS 1 TABLET: TAB at 09:53

## 2018-06-05 RX ADMIN — MORPHINE SULFATE 4 MG: 4 INJECTION INTRAVENOUS at 07:08

## 2018-06-05 RX ADMIN — Medication 100 MG: at 09:53

## 2018-06-05 RX ADMIN — TRAMADOL HYDROCHLORIDE 25 MG: 50 TABLET, COATED ORAL at 04:11

## 2018-06-05 RX ADMIN — FOLIC ACID 1 MG: 1 TABLET ORAL at 09:53

## 2018-06-05 RX ADMIN — PROCHLORPERAZINE EDISYLATE 5 MG: 5 INJECTION INTRAMUSCULAR; INTRAVENOUS at 03:50

## 2018-06-05 RX ADMIN — SODIUM CHLORIDE: 9 INJECTION, SOLUTION INTRAVENOUS at 02:34

## 2018-06-05 RX ADMIN — HYDROCODONE BITARTRATE AND ACETAMINOPHEN 1 TABLET: 5; 325 TABLET ORAL at 09:53

## 2018-06-05 ASSESSMENT — COGNITIVE AND FUNCTIONAL STATUS - GENERAL
MOVING TO AND FROM BED TO CHAIR: UNABLE
CLIMB 3 TO 5 STEPS WITH RAILING: A LITTLE
PERSONAL GROOMING: A LITTLE
TOILETING: A LITTLE
EATING MEALS: A LITTLE
MOVING FROM LYING ON BACK TO SITTING ON SIDE OF FLAT BED: A LITTLE
WALKING IN HOSPITAL ROOM: A LITTLE
DRESSING REGULAR LOWER BODY CLOTHING: A LOT
DAILY ACTIVITIY SCORE: 16
DRESSING REGULAR UPPER BODY CLOTHING: A LITTLE
SUGGESTED CMS G CODE MODIFIER DAILY ACTIVITY: CK
MOBILITY SCORE: 15
HELP NEEDED FOR BATHING: A LOT
TURNING FROM BACK TO SIDE WHILE IN FLAT BAD: A LOT
STANDING UP FROM CHAIR USING ARMS: A LITTLE
SUGGESTED CMS G CODE MODIFIER MOBILITY: CK

## 2018-06-05 ASSESSMENT — PAIN SCALES - GENERAL
PAINLEVEL_OUTOF10: 7
PAINLEVEL_OUTOF10: 5
PAINLEVEL_OUTOF10: 5
PAINLEVEL_OUTOF10: 6
PAINLEVEL_OUTOF10: 7
PAINLEVEL_OUTOF10: 5

## 2018-06-05 ASSESSMENT — LIFESTYLE VARIABLES
NAUSEA AND VOMITING: NO NAUSEA AND NO VOMITING
TOTAL SCORE: 1
NAUSEA AND VOMITING: NO NAUSEA AND NO VOMITING
ORIENTATION AND CLOUDING OF SENSORIUM: ORIENTED AND CAN DO SERIAL ADDITIONS
TREMOR: NO TREMOR
VISUAL DISTURBANCES: NOT PRESENT
HEADACHE, FULLNESS IN HEAD: NOT PRESENT
HEADACHE, FULLNESS IN HEAD: NOT PRESENT
VISUAL DISTURBANCES: NOT PRESENT
AUDITORY DISTURBANCES: NOT PRESENT
PAROXYSMAL SWEATS: NO SWEAT VISIBLE
PAROXYSMAL SWEATS: NO SWEAT VISIBLE
AGITATION: NORMAL ACTIVITY
ORIENTATION AND CLOUDING OF SENSORIUM: ORIENTED AND CAN DO SERIAL ADDITIONS
TREMOR: NO TREMOR
AUDITORY DISTURBANCES: NOT PRESENT
ANXIETY: MILDLY ANXIOUS
ANXIETY: NO ANXIETY (AT EASE)
AGITATION: NORMAL ACTIVITY
TOTAL SCORE: 0

## 2018-06-05 ASSESSMENT — ACTIVITIES OF DAILY LIVING (ADL): TOILETING: INDEPENDENT

## 2018-06-05 ASSESSMENT — GAIT ASSESSMENTS
DISTANCE (FEET): 150
ASSISTIVE DEVICE: SINGLE POINT CANE
DEVIATION: BRADYKINETIC
GAIT LEVEL OF ASSIST: STAND BY ASSIST

## 2018-06-05 NOTE — DISCHARGE INSTRUCTIONS
Discharge Instructions  Non-weight bearing to your Right shoulder, in sling for comfort, OK for elbow, wrist, digit Range of Motion  Diet regular with 9-13 servings of fruits and vegetables   Activities as tolerated   Follow ups with your primary care physician in 7-10 days, call for appointment   No smoking, no alcohol, no caffeine   Wear seat belt in motorized vehicle   Take all of your medications as perscribed   Keep appointments. FOllow up with Dr. Barreto or Dr. Pond in ! Week at the [facility]  If symptoms worsen call PCP, 911 or urgent care.N          Discharged to home by car with relative. Discharged via wheelchair, hospital escort: Yes.  Special equipment needed: Not Applicable    Be sure to schedule a follow-up appointment with your primary care doctor or any specialists as instructed.     Discharge Plan:   Influenza Vaccine Indication: Patient Refuses    I understand that a diet low in cholesterol, fat, and sodium is recommended for good health. Unless I have been given specific instructions below for another diet, I accept this instruction as my diet prescription.   Other diet: Regular as tolerated    Special Instructions: Discharge instructions for the Orthopedic Patient    Follow up with Primary Care Physician within 2 weeks of discharge to home, regarding:  Review of medications and diagnostic testing.  Surveillance for medical complications.  Workup and treatment of osteoporosis, if appropriate.     -Is this a Joint Replacement patient? No    -Is this patient being discharged with medication to prevent blood clots?  No    · Is patient discharged on Warfarin / Coumadin?   No     Humerus Fracture Treated With ORIF, Care After  Refer to this sheet in the next few weeks. These instructions provide you with information about caring for yourself after your procedure. Your health care provider may also give you more specific instructions. Your treatment has been planned according to  current medical practices, but problems sometimes occur. Call your health care provider if you have any problems or questions after your procedure.  WHAT TO EXPECT AFTER THE PROCEDURE  After your procedure, it is common to have:  · Pain.  · Swelling.  · Stiffness.  HOME CARE INSTRUCTIONS  If You Have a Shoulder Immobilizer or a Sling:  · Wear it as directed by your health care provider. Remove it only as directed by your health care provider.  Bathing  · Keep the bandage (dressing) dry until your health care provider says it can be removed.  · Take sponge baths only. Ask the surgeon when you can start showering or taking a bath.  Incision Care  · There are many different ways to close and cover an incision, including stitches, skin glue, and adhesive strips. Follow instructions from your health care provider about:  ¨ Incision care.  ¨ Dressing changes and removal.  ¨ Incision closure removal.  · Check your incision area every day for signs of infection. Watch for:  ¨ Redness, swelling, or pain.  ¨ Fluid, blood, or pus.  Managing Pain, Stiffness, and Swelling  · If directed, apply ice to the injured area.  ¨ Put ice in a plastic bag.  ¨ Place a towel between your skin and the bag.  ¨ Leave the ice on for 20 minutes, 2-3 times per day.  · Move your fingers often to avoid stiffness and to lessen swelling.  · If directed by your health care provider, raise the injured area above the level of your heart while you are sitting or lying down.  Driving  · Do not drive or operate heavy machinery while taking pain medicine.  · Do not drive while wearing a sling or a shoulder immobilizer.  Activity  · Return to your normal activities as directed by your health care provider. Ask your health care provider what activities are safe for you.  · Avoid lifting until your health care provider approves.  · Perform range-of-motion exercises only as directed by your health care provider.  General Instructions  · Do not use any tobacco  products, including cigarettes, chewing tobacco, or electronic cigarettes. Tobacco can delay bone healing. If you need help quitting, ask your health care provider.  · Take medicines only as directed by your health care provider.  · Keep all follow-up visits as directed by your health care provider. This is important.  SEEK MEDICAL CARE IF:  · You have pain that is not helped by medicine.  · You have a fever.  · You have redness, swelling, or pain at the site of your incision.  · You have fluid, blood, or pus coming from your incision site.  · You feel faint or light-headed.  · You develop a rash.  SEEK IMMEDIATE MEDICAL CARE IF:   · You have trouble breathing.  · You have numbness or tingling in your hand or fingers.     This information is not intended to replace advice given to you by your health care provider. Make sure you discuss any questions you have with your health care provider.     Document Released: 07/07/2006 Document Revised: 05/03/2016 Document Reviewed: 11/11/2015  CuÃ­date Interactive Patient Education ©2016 [employer]    Acetaminophen; Hydrocodone tablets or capsules  What is this medicine?  ACETAMINOPHEN; HYDROCODONE (a set a JANE stephen fen; rina droe KOE done) is a pain reliever. It is used to treat moderate to severe pain.  This medicine may be used for other purposes; ask your health care provider or pharmacist if you have questions.  COMMON BRAND NAME(S): Anexsia, Bancap HC, Ceta-Plus, Co-Gesic, Comfortpak, Dolagesic, Dolorex Forte, DuoCet, Hydrocet, Hydrogesic, Lorcet, Lorcet HD, Lorcet Plus, Lortab, Margesic H, Maxidone, Norco, Polygesic, Stagesic, Vanacet, Verdrocet, Vicodin, Vicodin ES, Vicodin HP, Xodol, Zydone  What should I tell my health care provider before I take this medicine?  They need to know if you have any of these conditions:  -brain tumor  -Crohn's disease, inflammatory bowel disease, or ulcerative colitis  -drug abuse or addiction  -head injury  -heart or circulation  problems  -if you often drink alcohol  -kidney disease or problems going to the bathroom  -liver disease  -lung disease, asthma, or breathing problems  -an unusual or allergic reaction to acetaminophen, hydrocodone, other opioid analgesics, other medicines, foods, dyes, or preservatives  -pregnant or trying to get pregnant  -breast-feeding  How should I use this medicine?  Take this medicine by mouth with a glass of water. Follow the directions on the prescription label. You can take it with or without food. If it upsets your stomach, take it with food. Do not take your medicine more often than directed.  A special MedGuide will be given to you by the pharmacist with each prescription and refill. Be sure to read this information carefully each time.  Talk to your pediatrician regarding the use of this medicine in children. Special care may be needed.  Overdosage: If you think you have taken too much of this medicine contact a poison control center or emergency room at once.  NOTE: This medicine is only for you. Do not share this medicine with others.  What if I miss a dose?  If you miss a dose, take it as soon as you can. If it is almost time for your next dose, take only that dose. Do not take double or extra doses.  What may interact with this medicine?  This medicine may interact with the following medications:  -alcohol  -antiviral medicines for HIV or AIDS  -atropine  -antihistamines for allergy, cough and cold  -certain antibiotics like erythromycin, clarithromycin  -certain medicines for anxiety or sleep  -certain medicines for bladder problems like oxybutynin, tolterodine  -certain medicines for depression like amitriptyline, fluoxetine, sertraline  -certain medicines for fungal infections like ketoconazole and itraconazole  -certain medicines for Parkinson's disease like benztropine, trihexyphenidyl  -certain medicines for seizures like carbamazepine, phenobarbital, phenytoin, primidone  -certain medicines  for stomach problems like dicyclomine, hyoscyamine  -certain medicines for travel sickness like scopolamine  -general anesthetics like halothane, isoflurane, methoxyflurane, propofol  -ipratropium  -local anesthetics like lidocaine, pramoxine, tetracaine  -MAOIs like Carbex, Eldepryl, Marplan, Nardil, and Parnate  -medicines that relax muscles for surgery  -other medicines with acetaminophen  -other narcotic medicines for pain or cough  -phenothiazines like chlorpromazine, mesoridazine, prochlorperazine, thioridazine  -rifampin  This list may not describe all possible interactions. Give your health care provider a list of all the medicines, herbs, non-prescription drugs, or dietary supplements you use. Also tell them if you smoke, drink alcohol, or use illegal drugs. Some items may interact with your medicine.  What should I watch for while using this medicine?  Tell your doctor or health care professional if your pain does not go away, if it gets worse, or if you have new or a different type of pain. You may develop tolerance to the medicine. Tolerance means that you will need a higher dose of the medicine for pain relief. Tolerance is normal and is expected if you take the medicine for a long time.  Do not suddenly stop taking your medicine because you may develop a severe reaction. Your body becomes used to the medicine. This does NOT mean you are addicted. Addiction is a behavior related to getting and using a drug for a non-medical reason. If you have pain, you have a medical reason to take pain medicine. Your doctor will tell you how much medicine to take. If your doctor wants you to stop the medicine, the dose will be slowly lowered over time to avoid any side effects.  There are different types of narcotic medicines (opiates). If you take more than one type at the same time or if you are taking another medicine that also causes drowsiness, you may have more side effects. Give your health care provider a list  of all medicines you use. Your doctor will tell you how much medicine to take. Do not take more medicine than directed. Call emergency for help if you have problems breathing or unusual sleepiness.  Do not take other medicines that contain acetaminophen with this medicine. Always read labels carefully. If you have questions, ask your doctor or pharmacist.  If you take too much acetaminophen get medical help right away. Too much acetaminophen can be very dangerous and cause liver damage. Even if you do not have symptoms, it is important to get help right away.  You may get drowsy or dizzy. Do not drive, use machinery, or do anything that needs mental alertness until you know how this medicine affects you. Do not stand or sit up quickly, especially if you are an older patient. This reduces the risk of dizzy or fainting spells. Alcohol may interfere with the effect of this medicine. Avoid alcoholic drinks.  The medicine will cause constipation. Try to have a bowel movement at least every 2 to 3 days. If you do not have a bowel movement for 3 days, call your doctor or health care professional.  Your mouth may get dry. Chewing sugarless gum or sucking hard candy, and drinking plenty of water may help. Contact your doctor if the problem does not go away or is severe.  What side effects may I notice from receiving this medicine?  Side effects that you should report to your doctor or health care professional as soon as possible:  -allergic reactions like skin rash, itching or hives, swelling of the face, lips, or tongue  -breathing problems  -confusion  -redness, blistering, peeling or loosening of the skin, including inside the mouth  -signs and symptoms of low blood pressure like dizziness; feeling faint or lightheaded, falls; unusually weak or tired  -trouble passing urine or change in the amount of urine  -yellowing of the eyes or skin  Side effects that usually do not require medical attention (report to your doctor or  health care professional if they continue or are bothersome):  -constipation  -dry mouth  -nausea, vomiting  -tiredness  This list may not describe all possible side effects. Call your doctor for medical advice about side effects. You may report side effects to FDA at 3-066-FDA-1156.  Where should I keep my medicine?  Keep out of the reach of children. This medicine can be abused. Keep your medicine in a safe place to protect it from theft. Do not share this medicine with anyone. Selling or giving away this medicine is dangerous and against the law.  This medicine may cause accidental overdose and death if it taken by other adults, children, or pets. Mix any unused medicine with a substance like cat litter or coffee grounds. Then throw the medicine away in a sealed container like a sealed bag or a coffee can with a lid. Do not use the medicine after the expiration date.  Store at room temperature between 15 and 30 degrees C (59 and 86 degrees F).  NOTE: This sheet is a summary. It may not cover all possible information. If you have questions about this medicine, talk to your doctor, pharmacist, or health care provider.  © 2018 Elsevier/Gold Standard (2016-09-09 10:02:16)      Ondansetron oral dissolving tablet  What is this medicine?  ONDANSETRON (on DAN se eli) is used to treat nausea and vomiting caused by chemotherapy. It is also used to prevent or treat nausea and vomiting after surgery.  This medicine may be used for other purposes; ask your health care provider or pharmacist if you have questions.  COMMON BRAND NAME(S): Zofran ODT  What should I tell my health care provider before I take this medicine?  They need to know if you have any of these conditions:  -heart disease  -history of irregular heartbeat  -liver disease  -low levels of magnesium or potassium in the blood  -an unusual or allergic reaction to ondansetron, granisetron, other medicines, foods, dyes, or preservatives  -pregnant or trying to get  pregnant  -breast-feeding  How should I use this medicine?  These tablets are made to dissolve in the mouth. Do not try to push the tablet through the foil backing. With dry hands, peel away the foil backing and gently remove the tablet. Place the tablet in the mouth and allow it to dissolve, then swallow. While you may take these tablets with water, it is not necessary to do so.  Talk to your pediatrician regarding the use of this medicine in children. Special care may be needed.  Overdosage: If you think you have taken too much of this medicine contact a poison control center or emergency room at once.  NOTE: This medicine is only for you. Do not share this medicine with others.  What if I miss a dose?  If you miss a dose, take it as soon as you can. If it is almost time for your next dose, take only that dose. Do not take double or extra doses.  What may interact with this medicine?  Do not take this medicine with any of the following medications:  -apomorphine  -certain medicines for fungal infections like fluconazole, itraconazole, ketoconazole, posaconazole, voriconazole  -cisapride  -dofetilide  -dronedarone  -pimozide  -thioridazine  -ziprasidone  This medicine may also interact with the following medications:  -carbamazepine  -certain medicines for depression, anxiety, or psychotic disturbances  -fentanyl  -linezolid  -MAOIs like Carbex, Eldepryl, Marplan, Nardil, and Parnate  -methylene blue (injected into a vein)  -other medicines that prolong the QT interval (cause an abnormal heart rhythm)  -phenytoin  -rifampicin  -tramadol  This list may not describe all possible interactions. Give your health care provider a list of all the medicines, herbs, non-prescription drugs, or dietary supplements you use. Also tell them if you smoke, drink alcohol, or use illegal drugs. Some items may interact with your medicine.  What should I watch for while using this medicine?  Check with your doctor or health care  professional as soon as you can if you have any sign of an allergic reaction.  What side effects may I notice from receiving this medicine?  Side effects that you should report to your doctor or health care professional as soon as possible:  -allergic reactions like skin rash, itching or hives, swelling of the face, lips, or tongue  -breathing problems  -confusion  -dizziness  -fast or irregular heartbeat  -feeling faint or lightheaded, falls  -fever and chills  -loss of balance or coordination  -seizures  -sweating  -swelling of the hands and feet  -tightness in the chest  -tremors  -unusually weak or tired  Side effects that usually do not require medical attention (report to your doctor or health care professional if they continue or are bothersome):  -constipation or diarrhea  -headache  This list may not describe all possible side effects. Call your doctor for medical advice about side effects. You may report side effects to FDA at 9-927-FDA-2436.  Where should I keep my medicine?  Keep out of the reach of children.  Store between 2 and 30 degrees C (36 and 86 degrees F). Throw away any unused medicine after the expiration date.  NOTE: This sheet is a summary. It may not cover all possible information. If you have questions about this medicine, talk to your doctor, pharmacist, or health care provider.  © 2018 Elsevier/Gold Standard (2014-09-24 16:21:52)          Depression / Suicide Risk    As you are discharged from this [facility] facility, it is important to learn how to keep safe from harming yourself.    Recognize the warning signs:  · Abrupt changes in personality, positive or negative- including increase in energy   · Giving away possessions  · Change in eating patterns- significant weight changes-  positive or negative  · Change in sleeping patterns- unable to sleep or sleeping all the time   · Unwillingness or inability to communicate  · Depression  · Unusual sadness, discouragement and  loneliness  · Talk of wanting to die  · Neglect of personal appearance   · Rebelliousness- reckless behavior  · Withdrawal from people/activities they love  · Confusion- inability to concentrate     If you or a loved one observes any of these behaviors or has concerns about self-harm, here's what you can do:  · Talk about it- your feelings and reasons for harming yourself  · Remove any means that you might use to hurt yourself (examples: pills, rope, extension cords, firearm)  · Get professional help from the community (Mental Health, Substance Abuse, psychological counseling)  · Do not be alone:Call your Safe Contact- someone whom you trust who will be there for you.  · Call your local CRISIS HOTLINE 853-0625 or 335-361-8346  · Call your local Children's Mobile Crisis Response Team [place] (183) 587-8863 or www.[employer]  · Call the toll free National Suicide Prevention Hotlines   · National Suicide Prevention Lifeline 324-578-ZMIO (8735)  · National Hope Line Network 800-SUICIDE (266-5117)

## 2018-06-05 NOTE — CARE PLAN
Problem: Safety  Goal: Will remain free from falls  Pt remains free from injury, Floor clear from clutter and cords.  Pt A+OX4, Non-Skid socks, Bed/chair alarm off. Proper signs outside pt door in place. Door remains open.  Pt uses call light appropriately. Call light with in reach of pt.  Hourly rounding in place.    Problem: Pain Management  Goal: Pain level will decrease to patient's comfort goal  Outcome: PROGRESSING AS EXPECTED   06/04/18 1658 06/04/18 1717   OTHER   Pain Scale 0 - 10  7 --    Non Verbal Scale  Calm;Unlabored Breathing --    Milian-Villafana Scale  --  2    Therapist Pain Assessment --  Post Activity Pain Same as Prior to Activity;1   PRN pain medication given, ice pack applied, arm sling in place.

## 2018-06-05 NOTE — CARE PLAN
Problem: Bowel/Gastric:  Goal: Normal bowel function is maintained or improved  Outcome: PROGRESSING SLOWER THAN EXPECTED  N and vomiting when phenergan PO given. compazine effective no oral narcotic given thus far.     Problem: Pain Management  Goal: Pain level will decrease to patient's comfort goal  Outcome: PROGRESSING SLOWER THAN EXPECTED  Pt reports pain manageable without pain medication unless mobilizing. Pt gets severe stomach reaction with narcotics when taken PO. States has taken tramadol in past with no difficulty. Oxy changed to tramadol. Pt has not yet needed oral pain meds.

## 2018-06-05 NOTE — THERAPY
"Physical Therapy Evaluation completed.   Bed Mobility:  Supine to Sit: Contact Guard Assist (mostly verbal instruction to motor plan hoe to get OOB)  Transfers: Sit to Stand: Stand by Assist  Gait: Level Of Assist: Stand by Assist with Single Point Cane       Plan of Care: Patient with no further skilled PT needs in the acute care setting at this time  Discharge Recommendations: Equipment: Single Point Cane. Post-acute therapy Discharge to home with outpatient or home health for additional skilled therapy services.    See \"Rehab Therapy-Acute\" Patient Summary Report for complete documentation.     "

## 2018-06-05 NOTE — DISCHARGE SUMMARY
Discharge Summary    CHIEF COMPLAINT ON ADMISSION  Chief Complaint   Patient presents with   • Arm Injury     RT arm.        Reason for Admission  EMS     Admission Date  6/4/2018    CODE STATUS  Full Code    HPI & HOSPITAL COURSE  This is a 57 y.o. female here with ground-level fall suffering and thus a right arm fracture. The patient was placed in a splint was evaluated by orthopedics and at this point they find that the patient is nonsurgical. Patient was evaluated by therapies and at this point they find that the patient is able to perform activities of daily living as long as she keeps the one arm with no activity. The patient will need to continue the right arm in a splint and will need to be off work for the duration of her healing as the patient is using that arm at work continuously. If the patient is able to be given modified work then she can report back to work as long as she does not use the right arm. At this point patient be discharged home with outpatient pain management and follow-ups.       Therefore, she is discharged in fair and stable condition to home with close outpatient follow-up.    The patient met 2-midnight criteria for an inpatient stay at the time of discharge.    Discharge Date  6/5/2018    FOLLOW UP ITEMS POST DISCHARGE  Follow with the primary care physician 7-10 days    DISCHARGE DIAGNOSES  Active Problems:    Displaced fracture of right humerus POA: Yes    Transaminitis POA: Yes    Alcohol abuse POA: Yes    Obese POA: Unknown  Resolved Problems:    * No resolved hospital problems. *      FOLLOW UP  No future appointments.  No follow-up provider specified.    MEDICATIONS ON DISCHARGE     Medication List      START taking these medications      Instructions   folic acid 1 MG Tabs  Commonly known as:  FOLVITE   Take 1 Tab by mouth every day.  Dose:  1 mg     HYDROcodone-acetaminophen 5-325 MG Tabs per tablet  Commonly known as:  NORCO   Take 1-2 Tabs by mouth every 6 hours as needed  for up to 14 days.  Dose:  1-2 Tab     multivitamin Tabs   Take 1 Tab by mouth every day.  Dose:  1 Tab     thiamine 100 MG tablet  Commonly known as:  THIAMINE   Take 1 Tab by mouth every day.  Dose:  100 mg            Allergies  Allergies   Allergen Reactions   • Bloodless    • Codeine Vomiting     Burns stomach, and vomit        DIET  Orders Placed This Encounter   Procedures   • DIET ORDER     Standing Status:   Standing     Number of Occurrences:   1     Order Specific Question:   Diet:     Answer:   Regular [1]       ACTIVITY  Light duty.  nonweightbearing on the right arm    CONSULTATIONS  Orthopedics    PROCEDURES  None    LABORATORY  Lab Results   Component Value Date    SODIUM 136 06/04/2018    POTASSIUM 3.8 06/04/2018    CHLORIDE 106 06/04/2018    CO2 18 (L) 06/04/2018    GLUCOSE 126 (H) 06/04/2018    BUN 10 06/04/2018    CREATININE 0.54 06/04/2018        Lab Results   Component Value Date    WBC 5.0 06/05/2018    HEMOGLOBIN 11.2 (L) 06/05/2018    HEMATOCRIT 35.0 (L) 06/05/2018    PLATELETCT 195 06/05/2018        Total time of the discharge process exceeds 38 minutes.

## 2018-06-05 NOTE — CARE PLAN
Problem: Discharge Barriers/Planning  Goal: Patient's continuum of care needs will be met  Outcome: MET Date Met: 06/05/18  Discharge orders acknowledged, Pt aware and compliant with new orders, D/C teaching completed, Pt able to verbalize needs and complaint with discharge orders. Medication script given to pt with explanation. IV removed.

## 2018-06-05 NOTE — DIETARY
NUTRITION SERVICES: BMI - Pt with BMI >40 (=40.2). Weight loss counseling not appropriate in acute care setting. RECOMMEND - Referral to outpatient nutrition services for weight management after D/C.

## 2018-06-05 NOTE — CARE PLAN
Problem: Safety  Goal: Will remain free from injury  Pt remains free from injury, Floor clear from clutter and cords.  Pt A+OX4, Non-Skid socks, Bed/chair alarm currently off. Proper signs outside pt door in place. Door remains open.  Pt uses call light appropriately. Call light with in reach of pt.  Hourly rounding in place.    Problem: Venous Thromboembolism (VTW)/Deep Vein Thrombosis (DVT) Prevention:  Goal: Patient will participate in Venous Thrombosis (VTE)/Deep Vein Thrombosis (DVT)Prevention Measures  Outcome: PROGRESSING AS EXPECTED   06/04/18 1945 06/05/18 0910   OTHER   Risk Assessment Score 1 --    VTE RISK Moderate --    Mechanical/VTE Prophylaxis   Mechanical Prophylaxis  --  SCDs, Sequential Compression Device   SCDs, Sequential Compression Device --  On       Problem: Pain Management  Goal: Pain level will decrease to patient's comfort goal  Outcome: PROGRESSING AS EXPECTED   06/04/18 1717 06/05/18 0700 06/05/18 1004   OTHER   Pain Scale 0 - 10  --  --  7   Non Verbal Scale  --  --  Calm;Unlabored Breathing   Milian-Villafana Scale  2  --  --    Therapist Pain Assessment Post Activity Pain Same as Prior to Activity;1 --  --    Comfort Goal --  Comfort at Rest;Comfort with Movement --    PRN pain medication given after Pt worked with pt, sling properly placed to RUE, encouraged rest.

## 2018-06-05 NOTE — THERAPY
"Occupational Therapy Evaluation completed.   Plan of Care: Patient with no further skilled OT needs in the acute care setting at this time  Discharge Recommendations:  Equipment: Single Point Cane, Tub Transfer Bench and Grab Bars. Post-acute therapy Discharge to home with outpatient or home health for additional skilled therapy services.    Patient NWB R UE with sling 2/2 fall at home demos and reports near functional baseline necessitating S with most ADLs and SBA with mobility with no AE / IV pole to simulate SPC. Patient reports will have son to help at home for brace and ADLs currently requiring more assist. Patient limited by pain and anticipate continued gains once pain managed. Patient has no further skilled OT needs in this setting at this time. Eval only. DC OT.     See \"Rehab Therapy-Acute\" Patient Summary Report for complete documentation.    "

## 2018-06-11 LAB — EKG IMPRESSION: NORMAL

## 2018-08-07 ENCOUNTER — NON-PROVIDER VISIT (OUTPATIENT)
Dept: NEUROLOGY | Facility: MEDICAL CENTER | Age: 58
End: 2018-08-07
Payer: COMMERCIAL

## 2018-08-07 DIAGNOSIS — S42.241A 4-PART FRACTURE OF SURGICAL NECK OF RIGHT HUMERUS, INITIAL ENCOUNTER FOR CLOSED FRACTURE: ICD-10-CM

## 2018-08-07 DIAGNOSIS — G56.90 AXILLARY NEUROPATHY: ICD-10-CM

## 2018-08-07 PROCEDURE — 95908 NRV CNDJ TST 3-4 STUDIES: CPT | Performed by: SPECIALIST

## 2018-08-07 PROCEDURE — 95886 MUSC TEST DONE W/N TEST COMP: CPT | Performed by: SPECIALIST

## 2018-08-07 NOTE — PROCEDURES
DATE OF SERVICE:  2018    ORDERING PROVIDER:  MIC Varghese    SUMMARY:  Nerve conduction studies of the right upper extremity revealed the   followin.  Right median motor distal latency, amplitude, and conduction velocity are   within normal limits.  2.  Right median sensory distal latency and amplitude are within normal   limits.  3.  Right median F wave exhibits a normal distal latency.  4.  Right ulnar motor distal latency, amplitude, and conduction velocity are   within normal limits.  5.  Right ulnar sensory distal latency and amplitude are within normal limits.    Needle examination of selected muscles studied in the right upper extremity   revealed both acute and chronic denervation changes in the deltoid muscles.    There were diffuse prominent fibrillation potentials at rest.  Recruitment was   significantly limited, but the patient was able to voluntarily recruit motor   units.    The right biceps, triceps, first dorsal interosseous and abductor pollicis   brevis muscles were normal electrophysiologically.  Nerve Conduction Studies     Stim Site NR Peak (ms) Norm Peak (ms) O-P Amp (µV) Norm O-P Amp Site1 Site2 Delta-P (ms) Dist (cm) Eric (m/s) Norm Eric (m/s)   Right Median Anti Sensory (2nd Digit)   Wrist    3.0  16.2 >10 Wrist 2nd Digit 3.0 14.0 *47 >50   Right Ulnar Anti Sensory (5th Digit)   Wrist    3.2  24.2 >10 Wrist 5th Digit 3.2 14.0 *44 >50        Stim Site NR Onset (ms) Norm Onset (ms) O-P Amp (mV) Norm O-P Amp Site1 Site2 Delta-0 (ms) Dist (cm) Eric (m/s) Norm Eric (m/s)   Right Median Motor (Abd Poll Brev)   Wrist    3.4 <4 9.1 >6 Elbow Wrist 4.2 26.0 62 >50   Elbow    7.6  7.9          Right Ulnar Motor (Abd Dig Min)   Wrist    2.7 <3.1 7.6 >7 B Elbow Wrist 3.3 20.0 61 >50   B Elbow    6.0  6.6  A Elbow B Elbow 1.4 10.0 71    A Elbow    7.4  6.9            F Wave Studies     NR F-Lat (ms) Lat Norm (ms)   Right Median (Abd Poll Brev)      25.26 <31                       Electromyography     Side Muscle Nerve Root Ins Act Fibs Psw Amp Dur Poly Recrt Int Pat Comment   Right Deltoid Axillary C5-6 *Incr *3+ *1+ Nml Nml *1+ *Reduced *25%    Right Biceps Musculocut C5-6 Nml Nml Nml Nml Nml 0 Nml Nml    Right Triceps Radial C6-7-8 Nml Nml Nml Nml Nml 0 Nml Nml    Right Abd Poll Brev Median C8-T1 Nml Nml Nml Nml Nml 0 Nml Nml    Right 1stDorInt Ulnar C8-T1 Nml Nml Nml Nml Nml 0 Nml Nml        IMPRESSION:  Acute and chronic denervation changes in the right deltoid muscle   consistent with the right axillary neuropathy.  The patient was able to   voluntarily recruit motor units in the deltoid muscle.  Clinical correlation   is suggested.       ____________________________________     G MD DEVEN ERICKSON / LYNDON    DD:  08/07/2018 13:19:04  DT:  08/07/2018 13:28:22    D#:  [id number]  Job#:  [id number]

## 2020-01-29 NOTE — ED NOTES
"RN spoke to Pt sister on phone, checked with Pt received verbal authorization to update sister. Sister \"Briana\" was updated. Terrance was then transferred to room.   " Medication:    Outpatient Medications Marked as Taking for the 1/29/20 encounter (Refill) with Mac Vega NP   Medication Sig Dispense Refill   • QUEtiapine (SEROQUEL) 50 MG tablet Take 0.5 tablets by mouth every morning AND 2 tablets nightly. 75 tablet 2       Message to Prescriber:     [x] Pharmacy has been verified.    [x] Patient completely out of medication (*Route encounter as high priority if checked)    [] Patient informed refill request can take up to 3 business days to be processed    Patient currently has follow up appointment scheduled

## 2024-03-05 ENCOUNTER — HOSPITAL ENCOUNTER (OUTPATIENT)
Dept: RADIOLOGY | Facility: MEDICAL CENTER | Age: 64
End: 2024-03-05

## 2024-03-05 ENCOUNTER — APPOINTMENT (OUTPATIENT)
Dept: RADIOLOGY | Facility: MEDICAL CENTER | Age: 64
DRG: 281 | End: 2024-03-05
Attending: EMERGENCY MEDICINE
Payer: MEDICARE

## 2024-03-05 ENCOUNTER — APPOINTMENT (OUTPATIENT)
Dept: RADIOLOGY | Facility: MEDICAL CENTER | Age: 64
DRG: 281 | End: 2024-03-05
Payer: MEDICARE

## 2024-03-05 ENCOUNTER — HOSPITAL ENCOUNTER (INPATIENT)
Facility: MEDICAL CENTER | Age: 64
LOS: 2 days | DRG: 281 | End: 2024-03-07
Attending: EMERGENCY MEDICINE | Admitting: HOSPITALIST
Payer: MEDICARE

## 2024-03-05 ENCOUNTER — APPOINTMENT (OUTPATIENT)
Dept: RADIOLOGY | Facility: MEDICAL CENTER | Age: 64
DRG: 281 | End: 2024-03-05
Attending: HOSPITALIST
Payer: MEDICARE

## 2024-03-05 DIAGNOSIS — I21.4 NSTEMI (NON-ST ELEVATED MYOCARDIAL INFARCTION) (HCC): ICD-10-CM

## 2024-03-05 PROBLEM — I95.9 HYPOTENSION: Status: ACTIVE | Noted: 2024-03-05

## 2024-03-05 LAB
ALBUMIN SERPL BCP-MCNC: 4.3 G/DL (ref 3.2–4.9)
ALBUMIN/GLOB SERPL: 1.4 G/DL
ALP SERPL-CCNC: 75 U/L (ref 30–99)
ALT SERPL-CCNC: 15 U/L (ref 2–50)
ANION GAP SERPL CALC-SCNC: 15 MMOL/L (ref 7–16)
APTT PPP: 131.5 SEC (ref 24.7–36)
AST SERPL-CCNC: 26 U/L (ref 12–45)
BASOPHILS # BLD AUTO: 0.5 % (ref 0–1.8)
BASOPHILS # BLD: 0.04 K/UL (ref 0–0.12)
BILIRUB SERPL-MCNC: 0.8 MG/DL (ref 0.1–1.5)
BUN SERPL-MCNC: 11 MG/DL (ref 8–22)
CALCIUM ALBUM COR SERPL-MCNC: 9.3 MG/DL (ref 8.5–10.5)
CALCIUM SERPL-MCNC: 9.5 MG/DL (ref 8.5–10.5)
CHLORIDE SERPL-SCNC: 105 MMOL/L (ref 96–112)
CO2 SERPL-SCNC: 21 MMOL/L (ref 20–33)
CREAT SERPL-MCNC: 0.51 MG/DL (ref 0.5–1.4)
EKG IMPRESSION: NORMAL
EKG IMPRESSION: NORMAL
EOSINOPHIL # BLD AUTO: 0.04 K/UL (ref 0–0.51)
EOSINOPHIL NFR BLD: 0.5 % (ref 0–6.9)
ERYTHROCYTE [DISTWIDTH] IN BLOOD BY AUTOMATED COUNT: 46.2 FL (ref 35.9–50)
EST. AVERAGE GLUCOSE BLD GHB EST-MCNC: 100 MG/DL
GFR SERPLBLD CREATININE-BSD FMLA CKD-EPI: 106 ML/MIN/1.73 M 2
GLOBULIN SER CALC-MCNC: 3 G/DL (ref 1.9–3.5)
GLUCOSE BLD STRIP.AUTO-MCNC: 100 MG/DL (ref 65–99)
GLUCOSE SERPL-MCNC: 101 MG/DL (ref 65–99)
HBA1C MFR BLD: 5.1 % (ref 4–5.6)
HCT VFR BLD AUTO: 41.2 % (ref 37–47)
HGB BLD-MCNC: 13.9 G/DL (ref 12–16)
IMM GRANULOCYTES # BLD AUTO: 0.02 K/UL (ref 0–0.11)
IMM GRANULOCYTES NFR BLD AUTO: 0.2 % (ref 0–0.9)
INR PPP: 1.08 (ref 0.87–1.13)
LYMPHOCYTES # BLD AUTO: 1.68 K/UL (ref 1–4.8)
LYMPHOCYTES NFR BLD: 20.6 % (ref 22–41)
MCH RBC QN AUTO: 31 PG (ref 27–33)
MCHC RBC AUTO-ENTMCNC: 33.7 G/DL (ref 32.2–35.5)
MCV RBC AUTO: 92 FL (ref 81.4–97.8)
MONOCYTES # BLD AUTO: 0.49 K/UL (ref 0–0.85)
MONOCYTES NFR BLD AUTO: 6 % (ref 0–13.4)
NEUTROPHILS # BLD AUTO: 5.87 K/UL (ref 1.82–7.42)
NEUTROPHILS NFR BLD: 72.2 % (ref 44–72)
NRBC # BLD AUTO: 0 K/UL
NRBC BLD-RTO: 0 /100 WBC (ref 0–0.2)
NT-PROBNP SERPL IA-MCNC: 236 PG/ML (ref 0–125)
PLATELET # BLD AUTO: 239 K/UL (ref 164–446)
PMV BLD AUTO: 10.1 FL (ref 9–12.9)
POTASSIUM SERPL-SCNC: 3.9 MMOL/L (ref 3.6–5.5)
PROT SERPL-MCNC: 7.3 G/DL (ref 6–8.2)
PROTHROMBIN TIME: 14.2 SEC (ref 12–14.6)
RBC # BLD AUTO: 4.48 M/UL (ref 4.2–5.4)
SODIUM SERPL-SCNC: 141 MMOL/L (ref 135–145)
TROPONIN T SERPL-MCNC: 367 NG/L (ref 6–19)
TROPONIN T SERPL-MCNC: 377 NG/L (ref 6–19)
TSH SERPL DL<=0.005 MIU/L-ACNC: 2.17 UIU/ML (ref 0.38–5.33)
UFH PPP CHRO-ACNC: 0.61 IU/ML
VIT B12 SERPL-MCNC: 377 PG/ML (ref 211–911)
WBC # BLD AUTO: 8.1 K/UL (ref 4.8–10.8)

## 2024-03-05 PROCEDURE — 36415 COLL VENOUS BLD VENIPUNCTURE: CPT

## 2024-03-05 PROCEDURE — 84443 ASSAY THYROID STIM HORMONE: CPT

## 2024-03-05 PROCEDURE — 83880 ASSAY OF NATRIURETIC PEPTIDE: CPT

## 2024-03-05 PROCEDURE — 84484 ASSAY OF TROPONIN QUANT: CPT

## 2024-03-05 PROCEDURE — 700105 HCHG RX REV CODE 258: Performed by: EMERGENCY MEDICINE

## 2024-03-05 PROCEDURE — 99291 CRITICAL CARE FIRST HOUR: CPT | Performed by: HOSPITALIST

## 2024-03-05 PROCEDURE — 96365 THER/PROPH/DIAG IV INF INIT: CPT

## 2024-03-05 PROCEDURE — 85025 COMPLETE CBC W/AUTO DIFF WBC: CPT

## 2024-03-05 PROCEDURE — 93005 ELECTROCARDIOGRAM TRACING: CPT | Performed by: EMERGENCY MEDICINE

## 2024-03-05 PROCEDURE — 93005 ELECTROCARDIOGRAM TRACING: CPT

## 2024-03-05 PROCEDURE — 700102 HCHG RX REV CODE 250 W/ 637 OVERRIDE(OP): Performed by: EMERGENCY MEDICINE

## 2024-03-05 PROCEDURE — 82962 GLUCOSE BLOOD TEST: CPT

## 2024-03-05 PROCEDURE — 85610 PROTHROMBIN TIME: CPT

## 2024-03-05 PROCEDURE — A9270 NON-COVERED ITEM OR SERVICE: HCPCS | Performed by: EMERGENCY MEDICINE

## 2024-03-05 PROCEDURE — A9270 NON-COVERED ITEM OR SERVICE: HCPCS | Performed by: HOSPITALIST

## 2024-03-05 PROCEDURE — 80053 COMPREHEN METABOLIC PANEL: CPT

## 2024-03-05 PROCEDURE — 85730 THROMBOPLASTIN TIME PARTIAL: CPT

## 2024-03-05 PROCEDURE — 700111 HCHG RX REV CODE 636 W/ 250 OVERRIDE (IP)

## 2024-03-05 PROCEDURE — 700102 HCHG RX REV CODE 250 W/ 637 OVERRIDE(OP): Performed by: HOSPITALIST

## 2024-03-05 PROCEDURE — 82607 VITAMIN B-12: CPT

## 2024-03-05 PROCEDURE — 71045 X-RAY EXAM CHEST 1 VIEW: CPT

## 2024-03-05 PROCEDURE — 96376 TX/PRO/DX INJ SAME DRUG ADON: CPT

## 2024-03-05 PROCEDURE — 770020 HCHG ROOM/CARE - TELE (206)

## 2024-03-05 PROCEDURE — 83036 HEMOGLOBIN GLYCOSYLATED A1C: CPT

## 2024-03-05 PROCEDURE — 96375 TX/PRO/DX INJ NEW DRUG ADDON: CPT

## 2024-03-05 PROCEDURE — 85520 HEPARIN ASSAY: CPT

## 2024-03-05 PROCEDURE — 700111 HCHG RX REV CODE 636 W/ 250 OVERRIDE (IP): Mod: JZ | Performed by: EMERGENCY MEDICINE

## 2024-03-05 PROCEDURE — 99285 EMERGENCY DEPT VISIT HI MDM: CPT

## 2024-03-05 PROCEDURE — 96366 THER/PROPH/DIAG IV INF ADDON: CPT

## 2024-03-05 RX ORDER — MORPHINE SULFATE 4 MG/ML
4 INJECTION INTRAVENOUS ONCE
Status: COMPLETED | OUTPATIENT
Start: 2024-03-05 | End: 2024-03-05

## 2024-03-05 RX ORDER — ONDANSETRON 2 MG/ML
4 INJECTION INTRAMUSCULAR; INTRAVENOUS ONCE
Status: COMPLETED | OUTPATIENT
Start: 2024-03-05 | End: 2024-03-05

## 2024-03-05 RX ORDER — SODIUM CHLORIDE 9 MG/ML
500 INJECTION, SOLUTION INTRAVENOUS ONCE
Status: COMPLETED | OUTPATIENT
Start: 2024-03-05 | End: 2024-03-05

## 2024-03-05 RX ORDER — OMEPRAZOLE 20 MG/1
20 CAPSULE, DELAYED RELEASE ORAL DAILY
Status: DISCONTINUED | OUTPATIENT
Start: 2024-03-06 | End: 2024-03-07 | Stop reason: HOSPADM

## 2024-03-05 RX ORDER — HEPARIN SODIUM 1000 [USP'U]/ML
30 INJECTION, SOLUTION INTRAVENOUS; SUBCUTANEOUS PRN
Status: DISCONTINUED | OUTPATIENT
Start: 2024-03-05 | End: 2024-03-06

## 2024-03-05 RX ORDER — HEPARIN SODIUM 5000 [USP'U]/100ML
0-30 INJECTION, SOLUTION INTRAVENOUS CONTINUOUS
Status: DISCONTINUED | OUTPATIENT
Start: 2024-03-05 | End: 2024-03-06

## 2024-03-05 RX ORDER — ERGOCALCIFEROL 1.25 MG/1
50000 CAPSULE ORAL
COMMUNITY

## 2024-03-05 RX ORDER — TRAZODONE HYDROCHLORIDE 50 MG/1
50-75 TABLET ORAL NIGHTLY
COMMUNITY

## 2024-03-05 RX ORDER — ATORVASTATIN CALCIUM 40 MG/1
40 TABLET, FILM COATED ORAL EVERY EVENING
Status: DISCONTINUED | OUTPATIENT
Start: 2024-03-05 | End: 2024-03-07 | Stop reason: HOSPADM

## 2024-03-05 RX ORDER — NITROGLYCERIN 0.4 MG/1
0.4 TABLET SUBLINGUAL
Status: DISCONTINUED | OUTPATIENT
Start: 2024-03-05 | End: 2024-03-07 | Stop reason: HOSPADM

## 2024-03-05 RX ORDER — PRAZOSIN HYDROCHLORIDE 5 MG/1
5 CAPSULE ORAL NIGHTLY
COMMUNITY

## 2024-03-05 RX ORDER — ACETAMINOPHEN 325 MG/1
650 TABLET ORAL EVERY 6 HOURS PRN
Status: DISCONTINUED | OUTPATIENT
Start: 2024-03-05 | End: 2024-03-07 | Stop reason: HOSPADM

## 2024-03-05 RX ORDER — ASPIRIN 81 MG/1
81 TABLET ORAL DAILY
Status: DISCONTINUED | OUTPATIENT
Start: 2024-03-06 | End: 2024-03-07 | Stop reason: HOSPADM

## 2024-03-05 RX ORDER — HEPARIN SODIUM 1000 [USP'U]/ML
4000 INJECTION, SOLUTION INTRAVENOUS; SUBCUTANEOUS ONCE
Status: COMPLETED | OUTPATIENT
Start: 2024-03-05 | End: 2024-03-05

## 2024-03-05 RX ADMIN — MORPHINE SULFATE 4 MG: 4 INJECTION, SOLUTION INTRAMUSCULAR; INTRAVENOUS at 18:38

## 2024-03-05 RX ADMIN — LIDOCAINE HYDROCHLORIDE 30 ML: 20 SOLUTION ORAL; TOPICAL at 20:26

## 2024-03-05 RX ADMIN — ATORVASTATIN CALCIUM 40 MG: 40 TABLET, FILM COATED ORAL at 23:51

## 2024-03-05 RX ADMIN — ONDANSETRON 4 MG: 2 INJECTION INTRAMUSCULAR; INTRAVENOUS at 18:33

## 2024-03-05 RX ADMIN — HEPARIN SODIUM 12 UNITS/KG/HR: 5000 INJECTION, SOLUTION INTRAVENOUS at 19:52

## 2024-03-05 RX ADMIN — ONDANSETRON 4 MG: 2 INJECTION INTRAMUSCULAR; INTRAVENOUS at 20:42

## 2024-03-05 RX ADMIN — HEPARIN SODIUM 4000 UNITS: 1000 INJECTION, SOLUTION INTRAVENOUS; SUBCUTANEOUS at 19:50

## 2024-03-05 RX ADMIN — SODIUM CHLORIDE 500 ML: 9 INJECTION, SOLUTION INTRAVENOUS at 20:27

## 2024-03-05 ASSESSMENT — ENCOUNTER SYMPTOMS
DEPRESSION: 0
DIARRHEA: 0
TREMORS: 0
STRIDOR: 0
HEARTBURN: 0
HEMOPTYSIS: 0
CLAUDICATION: 0
BACK PAIN: 0
WEAKNESS: 0
COUGH: 0
DIAPHORESIS: 0
POLYDIPSIA: 0
SHORTNESS OF BREATH: 1
SPUTUM PRODUCTION: 0
SINUS PAIN: 0
DOUBLE VISION: 0
HALLUCINATIONS: 0
FALLS: 0
SORE THROAT: 0
NAUSEA: 1
CONSTIPATION: 0
VOMITING: 1
PND: 0
ABDOMINAL PAIN: 0
EYE PAIN: 0
WHEEZING: 0
MYALGIAS: 0
FLANK PAIN: 0
CHILLS: 0
BLOOD IN STOOL: 0
PALPITATIONS: 0
PHOTOPHOBIA: 0
TINGLING: 0
HEADACHES: 0
ORTHOPNEA: 0
DIZZINESS: 1
NECK PAIN: 0
BRUISES/BLEEDS EASILY: 0
BLURRED VISION: 0
FEVER: 0

## 2024-03-05 ASSESSMENT — LIFESTYLE VARIABLES: SUBSTANCE_ABUSE: 0

## 2024-03-05 ASSESSMENT — FIBROSIS 4 INDEX: FIB4 SCORE: 1.71

## 2024-03-06 ENCOUNTER — APPOINTMENT (OUTPATIENT)
Dept: CARDIOLOGY | Facility: MEDICAL CENTER | Age: 64
DRG: 281 | End: 2024-03-06
Attending: INTERNAL MEDICINE
Payer: MEDICARE

## 2024-03-06 ENCOUNTER — APPOINTMENT (OUTPATIENT)
Dept: CARDIOLOGY | Facility: MEDICAL CENTER | Age: 64
DRG: 281 | End: 2024-03-06
Attending: HOSPITALIST
Payer: MEDICARE

## 2024-03-06 PROBLEM — E66.812 CLASS 2 SEVERE OBESITY WITH SERIOUS COMORBIDITY AND BODY MASS INDEX (BMI) OF 36.0 TO 36.9 IN ADULT (HCC): Chronic | Status: ACTIVE | Noted: 2018-06-04

## 2024-03-06 PROBLEM — E66.01 CLASS 2 SEVERE OBESITY WITH SERIOUS COMORBIDITY AND BODY MASS INDEX (BMI) OF 36.0 TO 36.9 IN ADULT (HCC): Chronic | Status: ACTIVE | Noted: 2018-06-04

## 2024-03-06 LAB
ACT BLD: 174 SEC (ref 74–137)
ALBUMIN SERPL BCP-MCNC: 4 G/DL (ref 3.2–4.9)
ALBUMIN/GLOB SERPL: 1.7 G/DL
ALP SERPL-CCNC: 106 U/L (ref 30–99)
ALT SERPL-CCNC: 47 U/L (ref 2–50)
ANION GAP SERPL CALC-SCNC: 10 MMOL/L (ref 7–16)
AST SERPL-CCNC: 74 U/L (ref 12–45)
BASOPHILS # BLD AUTO: 0.3 % (ref 0–1.8)
BASOPHILS # BLD: 0.02 K/UL (ref 0–0.12)
BILIRUB SERPL-MCNC: 1.5 MG/DL (ref 0.1–1.5)
BUN SERPL-MCNC: 10 MG/DL (ref 8–22)
CALCIUM ALBUM COR SERPL-MCNC: 9.2 MG/DL (ref 8.5–10.5)
CALCIUM SERPL-MCNC: 9.2 MG/DL (ref 8.5–10.5)
CHLORIDE SERPL-SCNC: 106 MMOL/L (ref 96–112)
CHOLEST SERPL-MCNC: 190 MG/DL (ref 100–199)
CO2 SERPL-SCNC: 22 MMOL/L (ref 20–33)
CREAT SERPL-MCNC: 0.61 MG/DL (ref 0.5–1.4)
EOSINOPHIL # BLD AUTO: 0.02 K/UL (ref 0–0.51)
EOSINOPHIL NFR BLD: 0.3 % (ref 0–6.9)
ERYTHROCYTE [DISTWIDTH] IN BLOOD BY AUTOMATED COUNT: 48 FL (ref 35.9–50)
GFR SERPLBLD CREATININE-BSD FMLA CKD-EPI: 102 ML/MIN/1.73 M 2
GLOBULIN SER CALC-MCNC: 2.4 G/DL (ref 1.9–3.5)
GLUCOSE SERPL-MCNC: 105 MG/DL (ref 65–99)
HCT VFR BLD AUTO: 36.7 % (ref 37–47)
HDLC SERPL-MCNC: 82 MG/DL
HGB BLD-MCNC: 12.3 G/DL (ref 12–16)
IMM GRANULOCYTES # BLD AUTO: 0.02 K/UL (ref 0–0.11)
IMM GRANULOCYTES NFR BLD AUTO: 0.3 % (ref 0–0.9)
LDLC SERPL CALC-MCNC: 98 MG/DL
LV EJECT FRACT  99904: 40
LV EJECT FRACT MOD 4C 99902: 63.56
LYMPHOCYTES # BLD AUTO: 1.57 K/UL (ref 1–4.8)
LYMPHOCYTES NFR BLD: 22.2 % (ref 22–41)
MCH RBC QN AUTO: 31.5 PG (ref 27–33)
MCHC RBC AUTO-ENTMCNC: 33.5 G/DL (ref 32.2–35.5)
MCV RBC AUTO: 94.1 FL (ref 81.4–97.8)
MONOCYTES # BLD AUTO: 0.45 K/UL (ref 0–0.85)
MONOCYTES NFR BLD AUTO: 6.4 % (ref 0–13.4)
NEUTROPHILS # BLD AUTO: 4.99 K/UL (ref 1.82–7.42)
NEUTROPHILS NFR BLD: 70.5 % (ref 44–72)
NRBC # BLD AUTO: 0 K/UL
NRBC BLD-RTO: 0 /100 WBC (ref 0–0.2)
PLATELET # BLD AUTO: 170 K/UL (ref 164–446)
PMV BLD AUTO: 10.4 FL (ref 9–12.9)
POTASSIUM SERPL-SCNC: 3.9 MMOL/L (ref 3.6–5.5)
PROT SERPL-MCNC: 6.4 G/DL (ref 6–8.2)
RBC # BLD AUTO: 3.9 M/UL (ref 4.2–5.4)
SODIUM SERPL-SCNC: 138 MMOL/L (ref 135–145)
TRIGL SERPL-MCNC: 51 MG/DL (ref 0–149)
UFH PPP CHRO-ACNC: 0.25 IU/ML
UFH PPP CHRO-ACNC: 0.49 IU/ML
WBC # BLD AUTO: 7.1 K/UL (ref 4.8–10.8)

## 2024-03-06 PROCEDURE — 4A023N7 MEASUREMENT OF CARDIAC SAMPLING AND PRESSURE, LEFT HEART, PERCUTANEOUS APPROACH: ICD-10-PCS | Performed by: INTERNAL MEDICINE

## 2024-03-06 PROCEDURE — 85520 HEPARIN ASSAY: CPT | Mod: 91

## 2024-03-06 PROCEDURE — 99152 MOD SED SAME PHYS/QHP 5/>YRS: CPT

## 2024-03-06 PROCEDURE — 770020 HCHG ROOM/CARE - TELE (206)

## 2024-03-06 PROCEDURE — 99152 MOD SED SAME PHYS/QHP 5/>YRS: CPT | Performed by: INTERNAL MEDICINE

## 2024-03-06 PROCEDURE — 700111 HCHG RX REV CODE 636 W/ 250 OVERRIDE (IP)

## 2024-03-06 PROCEDURE — 85347 COAGULATION TIME ACTIVATED: CPT

## 2024-03-06 PROCEDURE — 93458 L HRT ARTERY/VENTRICLE ANGIO: CPT | Mod: 26 | Performed by: INTERNAL MEDICINE

## 2024-03-06 PROCEDURE — 93306 TTE W/DOPPLER COMPLETE: CPT

## 2024-03-06 PROCEDURE — 700117 HCHG RX CONTRAST REV CODE 255: Performed by: HOSPITALIST

## 2024-03-06 PROCEDURE — B2111ZZ FLUOROSCOPY OF MULTIPLE CORONARY ARTERIES USING LOW OSMOLAR CONTRAST: ICD-10-PCS | Performed by: INTERNAL MEDICINE

## 2024-03-06 PROCEDURE — 99233 SBSQ HOSP IP/OBS HIGH 50: CPT | Performed by: HOSPITALIST

## 2024-03-06 PROCEDURE — 700117 HCHG RX CONTRAST REV CODE 255: Performed by: INTERNAL MEDICINE

## 2024-03-06 PROCEDURE — A9270 NON-COVERED ITEM OR SERVICE: HCPCS | Performed by: HOSPITALIST

## 2024-03-06 PROCEDURE — 99223 1ST HOSP IP/OBS HIGH 75: CPT | Mod: 25 | Performed by: INTERNAL MEDICINE

## 2024-03-06 PROCEDURE — 700101 HCHG RX REV CODE 250

## 2024-03-06 PROCEDURE — 80061 LIPID PANEL: CPT

## 2024-03-06 PROCEDURE — 700111 HCHG RX REV CODE 636 W/ 250 OVERRIDE (IP): Performed by: EMERGENCY MEDICINE

## 2024-03-06 PROCEDURE — 80053 COMPREHEN METABOLIC PANEL: CPT

## 2024-03-06 PROCEDURE — 93306 TTE W/DOPPLER COMPLETE: CPT | Mod: 26 | Performed by: INTERNAL MEDICINE

## 2024-03-06 PROCEDURE — 85025 COMPLETE CBC W/AUTO DIFF WBC: CPT

## 2024-03-06 PROCEDURE — 700102 HCHG RX REV CODE 250 W/ 637 OVERRIDE(OP): Performed by: HOSPITALIST

## 2024-03-06 RX ORDER — VERAPAMIL HYDROCHLORIDE 2.5 MG/ML
INJECTION, SOLUTION INTRAVENOUS
Status: COMPLETED
Start: 2024-03-06 | End: 2024-03-06

## 2024-03-06 RX ORDER — MIDAZOLAM HYDROCHLORIDE 1 MG/ML
INJECTION INTRAMUSCULAR; INTRAVENOUS
Status: COMPLETED
Start: 2024-03-06 | End: 2024-03-06

## 2024-03-06 RX ORDER — HEPARIN SODIUM 1000 [USP'U]/ML
INJECTION, SOLUTION INTRAVENOUS; SUBCUTANEOUS
Status: COMPLETED
Start: 2024-03-06 | End: 2024-03-06

## 2024-03-06 RX ORDER — HEPARIN SODIUM 200 [USP'U]/100ML
INJECTION, SOLUTION INTRAVENOUS
Status: COMPLETED
Start: 2024-03-06 | End: 2024-03-06

## 2024-03-06 RX ORDER — LIDOCAINE HYDROCHLORIDE 20 MG/ML
INJECTION, SOLUTION INFILTRATION; PERINEURAL
Status: COMPLETED
Start: 2024-03-06 | End: 2024-03-06

## 2024-03-06 RX ADMIN — IOHEXOL 10 ML: 350 INJECTION, SOLUTION INTRAVENOUS at 16:14

## 2024-03-06 RX ADMIN — HUMAN ALBUMIN MICROSPHERES AND PERFLUTREN 3 ML: 10; .22 INJECTION, SOLUTION INTRAVENOUS at 14:56

## 2024-03-06 RX ADMIN — HEPARIN SODIUM 2000 UNITS: 200 INJECTION, SOLUTION INTRAVENOUS at 15:46

## 2024-03-06 RX ADMIN — NITROGLYCERIN 10 ML: 20 INJECTION INTRAVENOUS at 15:46

## 2024-03-06 RX ADMIN — HEPARIN SODIUM 2000 UNITS: 1000 INJECTION, SOLUTION INTRAVENOUS; SUBCUTANEOUS at 02:39

## 2024-03-06 RX ADMIN — ASPIRIN 81 MG: 81 TABLET, COATED ORAL at 05:24

## 2024-03-06 RX ADMIN — ATORVASTATIN CALCIUM 40 MG: 40 TABLET, FILM COATED ORAL at 18:01

## 2024-03-06 RX ADMIN — FENTANYL CITRATE 100 MCG: 50 INJECTION, SOLUTION INTRAMUSCULAR; INTRAVENOUS at 16:06

## 2024-03-06 RX ADMIN — OMEPRAZOLE 20 MG: 20 CAPSULE, DELAYED RELEASE ORAL at 05:24

## 2024-03-06 RX ADMIN — LIDOCAINE HYDROCHLORIDE: 20 INJECTION, SOLUTION INFILTRATION; PERINEURAL at 15:46

## 2024-03-06 RX ADMIN — MIDAZOLAM HYDROCHLORIDE 2 MG: 2 INJECTION, SOLUTION INTRAMUSCULAR; INTRAVENOUS at 16:11

## 2024-03-06 RX ADMIN — MIDAZOLAM HYDROCHLORIDE 2 MG: 2 INJECTION, SOLUTION INTRAMUSCULAR; INTRAVENOUS at 16:06

## 2024-03-06 RX ADMIN — VERAPAMIL HYDROCHLORIDE 5 MG: 2.5 INJECTION, SOLUTION INTRAVENOUS at 15:46

## 2024-03-06 RX ADMIN — HEPARIN SODIUM: 1000 INJECTION, SOLUTION INTRAVENOUS; SUBCUTANEOUS at 15:45

## 2024-03-06 ASSESSMENT — LIFESTYLE VARIABLES
EVER FELT BAD OR GUILTY ABOUT YOUR DRINKING: NO
HAVE PEOPLE ANNOYED YOU BY CRITICIZING YOUR DRINKING: NO
ON A TYPICAL DAY WHEN YOU DRINK ALCOHOL HOW MANY DRINKS DO YOU HAVE: 3
CONSUMPTION TOTAL: POSITIVE
TOTAL SCORE: 0
EVER HAD A DRINK FIRST THING IN THE MORNING TO STEADY YOUR NERVES TO GET RID OF A HANGOVER: NO
HOW MANY TIMES IN THE PAST YEAR HAVE YOU HAD 5 OR MORE DRINKS IN A DAY: 0
HAVE YOU EVER FELT YOU SHOULD CUT DOWN ON YOUR DRINKING: NO
TOTAL SCORE: 0
TOTAL SCORE: 0
ALCOHOL_USE: YES
AVERAGE NUMBER OF DAYS PER WEEK YOU HAVE A DRINK CONTAINING ALCOHOL: 7
DOES PATIENT WANT TO STOP DRINKING: NO

## 2024-03-06 ASSESSMENT — PAIN DESCRIPTION - PAIN TYPE
TYPE: ACUTE PAIN
TYPE: ACUTE PAIN

## 2024-03-06 ASSESSMENT — PATIENT HEALTH QUESTIONNAIRE - PHQ9
2. FEELING DOWN, DEPRESSED, IRRITABLE, OR HOPELESS: NOT AT ALL
1. LITTLE INTEREST OR PLEASURE IN DOING THINGS: NOT AT ALL
SUM OF ALL RESPONSES TO PHQ9 QUESTIONS 1 AND 2: 0

## 2024-03-06 ASSESSMENT — ENCOUNTER SYMPTOMS
HEARTBURN: 0
TREMORS: 0
WEIGHT LOSS: 0
DOUBLE VISION: 0
HEMOPTYSIS: 0
SENSORY CHANGE: 0
PALPITATIONS: 0
MYALGIAS: 0
PHOTOPHOBIA: 0
VOMITING: 0
DIARRHEA: 0
NERVOUS/ANXIOUS: 1
CHILLS: 0
FEVER: 0
ORTHOPNEA: 0
NAUSEA: 0
HEADACHES: 0
EYE PAIN: 0
DIZZINESS: 0
BLURRED VISION: 0
COUGH: 0

## 2024-03-06 ASSESSMENT — FIBROSIS 4 INDEX: FIB4 SCORE: 2.41

## 2024-03-06 NOTE — PROGRESS NOTES
Hospital Medicine Daily Progress Note    Date of Service  3/6/2024    Chief Complaint  Deja Elias is a 63 y.o. female admitted 3/5/2024 with chest pain    Hospital Course  Deja Elias is a 63 y.o. female who presented 3/5/2024 with possible history of hypertension, anxiety, alcohol abuse, gastric bypass who presents to the hospital from [facility] for chest pain that started today while she was shoveling snow.  She continues to shovel snow despite pain.  The chest pain is exertional but nonradiating.  It feels like someone sitting on her chest.  She denies any shortness of breath but does complain of nausea, vomiting, lightheadedness.  Patient does drink rum every night.  She denies smoking cigarettes.  Patient presented to hospital from [facility] where she was given Nitropaste, IV heparin and aspirin since her troponin was elevated.  Upon arrival to [facility] she was found to be hypotensive, bradycardic and dizzy.  Nitropaste was removed.  Patient states that her chest pain has now resolved.     EKG interpreted by me found normal sinus rhythm without ST segment changes       Interval Problem Update  Patient a very brief sharp chest pain last night no recurrence of any chest pain this a.m.    Patient's current troponin is 367    I discussed and consulted Dr. Orozco of cardiology as I believe the patient may benefit from a coronary angiogram    I updated the patient that I felt that a coronary angiogram may be beneficial in her case    Patient is made n.p.o.    Continuing IV heparin    BP is low normal so holding on beta-blocker for now    I have discussed this patient's plan of care and discharge plan at IDT rounds today with Case Management, Nursing, Nursing leadership, and other members of the IDT team.    Consultants/Specialty  cardiology    Code Status  Full Code    Disposition  The patient is not medically cleared for discharge to home or a post-acute facility.  Anticipate discharge to: home with  close outpatient follow-up    I have placed the appropriate orders for post-discharge needs.    Review of Systems  Review of Systems   Constitutional:  Negative for chills, fever and weight loss.   HENT:  Negative for ear pain and hearing loss.    Eyes:  Negative for blurred vision, double vision, photophobia and pain.   Respiratory:  Negative for cough and hemoptysis.    Cardiovascular:  Positive for chest pain. Negative for palpitations and orthopnea.   Gastrointestinal:  Negative for diarrhea, heartburn, nausea and vomiting.   Genitourinary:  Negative for dysuria and frequency.   Musculoskeletal:  Negative for myalgias.   Neurological:  Negative for dizziness, tremors, sensory change and headaches.   Psychiatric/Behavioral:  The patient is nervous/anxious.         Physical Exam  Temp:  [36.4 °C (97.5 °F)-37 °C (98.6 °F)] 37 °C (98.6 °F)  Pulse:  [55-93] 90  Resp:  [14-20] 18  BP: ()/(42-89) 128/80  SpO2:  [90 %-95 %] 90 %    Physical Exam  Constitutional:       General: She is not in acute distress.     Appearance: She is obese. She is not ill-appearing or diaphoretic.   HENT:      Mouth/Throat:      Mouth: Mucous membranes are moist.   Eyes:      Extraocular Movements: Extraocular movements intact.      Pupils: Pupils are equal, round, and reactive to light.   Cardiovascular:      Rate and Rhythm: Normal rate and regular rhythm.      Heart sounds: No murmur heard.     No gallop.   Pulmonary:      Effort: No respiratory distress.      Breath sounds: No stridor. No wheezing.   Chest:      Chest wall: No tenderness.   Abdominal:      General: There is no distension.      Palpations: There is no mass.      Tenderness: There is no abdominal tenderness. There is no guarding.      Hernia: No hernia is present.   Musculoskeletal:         General: No swelling, tenderness, deformity or signs of injury.      Cervical back: Normal range of motion.      Right lower leg: No edema.   Skin:     Capillary Refill: Capillary  refill takes 2 to 3 seconds.      Coloration: Skin is not jaundiced or pale.      Findings: No bruising or rash.   Neurological:      General: No focal deficit present.      Cranial Nerves: No cranial nerve deficit.      Sensory: No sensory deficit.      Motor: No weakness.   Psychiatric:         Mood and Affect: Mood normal.         Behavior: Behavior normal.         Fluids    Intake/Output Summary (Last 24 hours) at 3/6/2024 0948  Last data filed at 3/6/2024 0600  Gross per 24 hour   Intake 40 ml   Output 250 ml   Net -210 ml       Laboratory  Recent Labs     03/05/24  1747 03/06/24  0206   WBC 8.1 7.1   RBC 4.48 3.90*   HEMOGLOBIN 13.9 12.3   HEMATOCRIT 41.2 36.7*   MCV 92.0 94.1   MCH 31.0 31.5   MCHC 33.7 33.5   RDW 46.2 48.0   PLATELETCT 239 170   MPV 10.1 10.4     Recent Labs     03/05/24  1747   SODIUM 141   POTASSIUM 3.9   CHLORIDE 105   CO2 21   GLUCOSE 101*   BUN 11   CREATININE 0.51   CALCIUM 9.5     Recent Labs     03/05/24 2055   APTT 131.5*   INR 1.08               Imaging  DX-CHEST-LIMITED (1 VIEW)   Final Result      No acute cardiopulmonary abnormality identified.      OUTSIDE IMAGES-DX CHEST   Final Result      EC-ECHOCARDIOGRAM COMPLETE W/O CONT    (Results Pending)   CL-LEFT HEART CATHETERIZATION WITH POSSIBLE INTERVENTION    (Results Pending)        Assessment/Plan  * NSTEMI (non-ST elevated myocardial infarction) ([facility])- (present on admission)  Assessment & Plan  Tele    Serial troponins    Tele to monitor for arrhythmia    Aspirin statin        BP is low normal-so no beta-blocker at this time    IV heparin    Cardiology consulted for possible left heart cath    Check echocardiogram        Hypotension- (present on admission)  Assessment & Plan  Presents with bradycardia and hypotension.  Likely secondary to Nitropaste which . Continue to monitor    Check orthostatics      Class 2 severe obesity with serious comorbidity and body mass index (BMI) of 36.0 to 36.9 in adult ([facility])- (present on  admission)  Assessment & Plan  Weight loss and exercise recommended    Alcohol abuse- (present on admission)  Assessment & Plan  No signs of alcohol withdrawal         Greater than 51 minutes spent prepping to see patient (e.g. review of tests) obtaining and/or reviewing separately obtained history. Performing a medically appropriate examination and/ evaluation.  Counseling and educating the patient/family/caregiver.  Ordering medications, tests, or procedures.  Referring and communicating with other health care professionals.  Documenting clinical information in EPIC.  Independently interpreting results and communicating results to patient/family/caregiver.  Care coordination.       VTE prophylaxis: VTE Selection    I have performed a physical exam and reviewed and updated ROS and Plan today (3/6/2024). In review of yesterday's note (3/5/2024), there are no changes except as documented above.

## 2024-03-06 NOTE — CARE PLAN
Problem: Pain - Standard  Goal: Alleviation of pain or a reduction in pain to the patient’s comfort goal  Outcome: Progressing     Problem: Knowledge Deficit - Standard  Goal: Patient and family/care givers will demonstrate understanding of plan of care, disease process/condition, diagnostic tests and medications  Outcome: Progressing   The patient is Watcher - Medium risk of patient condition declining or worsening    Shift Goals  Clinical Goals: heparin gtt, telemetry    Progress made toward(s) clinical / shift goals:  progressing    Patient is not progressing towards the following goals:

## 2024-03-06 NOTE — ED NOTES
Jenny from Lab called with critical result of APTT I31.5 at 2209. Critical lab result read back to Elías.   Dr. Hernandez notified of critical lab result at 2210.

## 2024-03-06 NOTE — ED TRIAGE NOTES
Chief Complaint   Patient presents with    Chest Pain     Patient transfer from [facility]. Patient had chest pain this morning after shoveling snow. Patient trop was .4.     Patient given 4mg zofran   324 ASA  300 plavix, and had nitro paste on arrival.

## 2024-03-06 NOTE — HOSPITAL COURSE
Deja Elias is a 63 y.o. female who presented 3/5/2024 with possible history of hypertension, anxiety, alcohol abuse, gastric bypass who presents to the hospital from [facility] for chest pain that started today while she was shoveling snow.  She continues to shovel snow despite pain.  The chest pain is exertional but nonradiating.  It feels like someone sitting on her chest.  She denies any shortness of breath but does complain of nausea, vomiting, lightheadedness.  Patient does drink rum every night.  She denies smoking cigarettes.  Patient presented to hospital from [facility] where she was given Nitropaste, IV heparin and aspirin since her troponin was elevated.  Upon arrival to [facility] she was found to be hypotensive, bradycardic and dizzy.  Nitropaste was removed.  Patient states that her chest pain has now resolved.     EKG interpreted by me found normal sinus rhythm without ST segment changes

## 2024-03-06 NOTE — ED NOTES
Lab called requesting a new blue top for heparin Xa. Lab informed heparin was already started, new blue top sent to lab.

## 2024-03-06 NOTE — ASSESSMENT & PLAN NOTE
Tele    Serial troponins    Tele to monitor for arrhythmia    Aspirin statin        BP is low normal-so no beta-blocker at this time    IV heparin    Cardiology consulted for possible left heart cath    Check echocardiogram

## 2024-03-06 NOTE — CONSULTS
Reason for Consult:  Asked by Dr Dinh Macias M.D. to see this patient with NSTEMI patient's PCP: AROLDO Burks    CC:   Chief Complaint   Patient presents with    Chest Pain     Patient transfer from [facility]. Patient had chest pain this morning after shoveling snow. Patient trop was .4.     Patient given 4mg zofran   324 ASA  300 plavix, and had nitro paste on arrival.        HPI: This is a very pleasant 63-year-old woman without significant medical history except obesity who presents from [facility] as a transfer she presented there with chest pain after shoveling snow yesterday with significant difficult snowstorm she has not had this before she was given appropriate treatment but had a bad reaction to Nitropaste.  She is seen with her family at the bedside is feeling better her mother did die a year ago but she does not recall any other stress.  Her troponins are positive EKG is normal    Medications / Drug list prior to admission:  No current facility-administered medications on file prior to encounter.     Current Outpatient Medications on File Prior to Encounter   Medication Sig Dispense Refill    prazosin (MINIPRESS) 5 MG Cap Take 5 mg by mouth every evening.      traZODone (DESYREL) 50 MG Tab Take 50-75 mg by mouth every evening.      Vitamin D, Ergocalciferol, 81523 units Cap Take 50,000 Units by mouth every Saturday.         Current list of administered Medications:    Current Facility-Administered Medications:     heparin infusion 25,000 units in 500 mL 0.45% NACL, 0-30 Units/kg/hr (Adjusted), Intravenous, Continuous, Mary Ellen Padron M.D., Last Rate: 19 mL/hr at 03/06/24 0704, 14 Units/kg/hr at 03/06/24 0704    heparin injection 2,000 Units, 30 Units/kg (Adjusted), Intravenous, PRN, Mary Ellen Padron M.D., 2,000 Units at 03/06/24 0239    acetaminophen (Tylenol) tablet 650 mg, 650 mg, Oral, Q6HRS PRN, Tory Hernandez M.D.    aspirin EC tablet 81 mg, 81 mg, Oral, DAILY, Tory Hernandez  M.D., 81 mg at 24 0524    atorvastatin (Lipitor) tablet 40 mg, 40 mg, Oral, Q EVENING, Tory Hernandez M.D., 40 mg at 24 2351    nitroglycerin (Nitrostat) tablet 0.4 mg, 0.4 mg, Sublingual, Q5 MIN PRN, Tory Hernandez M.D.    omeprazole (PriLOSEC) capsule 20 mg, 20 mg, Oral, DAILY, Tory Hernandez M.D., 20 mg at 24 0524    Past Medical History:   Diagnosis Date    Anxiety     Hypoglycemia     NSTEMI (non-ST elevated myocardial infarction) ([facility]) 3/5/2024    Substance abuse ([facility])        Past Surgical History:   Procedure Laterality Date    BREAST RECONSTRUCTION Bilateral 2012    Procedure: WITH REPOSITIONING OF IMFRAMMARY FOLDS;  Surgeon: Hilario Wilhelm M.D.;  Location: [facility];  Service:     ABDOMINOPLASTY N/A 2012    Procedure: REVERSE ;  Surgeon: Hilario Wilhelm M.D.;  Location: [facility];  Service:     BREAST IMPLANT REVISION Bilateral 2012    Procedure: EXCHANGE TO LARGER SALINE IMPLANTS;  Surgeon: Hilario Wilhelm M.D.;  Location: [facility];  Service:     CAPSULECTOMY Bilateral 2012    Procedure: CAPSULECTOMY;  Surgeon: Hilario Wilhelm M.D.;  Location: [facility];  Service:     OR  DELIVERY ONLY      CHOLECYSTECTOMY      OTHER      cosmetic arms, legs, breast, stomach    OTHER      ablation (uterine)       Family History   Problem Relation Age of Onset    Diabetes Unknown     Stroke Unknown     Allergies Mother     Arthritis Mother     Allergies Father     Arthritis Father     Asthma Father     Diabetes Father      Patient family history was personally reviewed, no pertinent family history to current presentation    Social History     Tobacco Use    Smoking status: Never    Smokeless tobacco: Never   Vaping Use    Vaping Use: Never used   Substance Use Topics    Alcohol use: No    Drug use: No       ALLERGIES:  Allergies   Allergen Reactions    Bloodless     Codeine Vomiting     Burns stomach, and  "vomit        Review of systems:  A complete review of symptoms was reviewed with patient. This is reviewed in H&P and PMH. ALL OTHERS reviewed and negative    Physical exam:  Patient Vitals for the past 24 hrs:   BP Temp Temp src Pulse Resp SpO2 Height Weight   24 0434 99/64 37 °C (98.6 °F) Temporal 78 18 90 % -- 93.6 kg (206 lb 5.6 oz)   24 0000 134/82 -- -- 90 19 93 % -- --   24 2357 -- -- -- -- -- -- -- 93.6 kg (206 lb 5.6 oz)   24 114/74 -- -- 79 18 92 % -- --   24 112/74 -- -- 84 -- 95 % -- --   24 119/73 -- -- 85 -- 95 % -- --   24 110/78 -- -- 83 17 94 % -- --   24 119/73 -- -- 75 -- 91 % -- --   24 (!) 88/61 -- -- (!) 55 19 93 % -- --   24 (!) 83/58 -- -- (!) 56 20 93 % -- --   24 95/66 -- -- 70 -- 92 % -- --   24 (!) 60/42 -- -- (!) 58 -- 94 % -- --   24 99/52 -- -- 75 14 93 % -- --   24 1800 (!) 164/83 -- -- 93 18 95 % -- --   24 1738 (!) 153/89 36.4 °C (97.5 °F) Temporal 93 18 93 % 1.6 m (5' 3\") 90.7 kg (200 lb)     General: No acute distress.   EYES: no jaundice  HEENT: OP clear   Neck:  No JVD.   CVS:  [  RRR.   Resp: Normal respiratory effort,   Abdomen: ND,  Skin: Grossly nothing acute no obvious rashes  Neurological: Alert, Moves all extremities  Extremities:   [  no edema. No cyanosis.       Data:  Laboratory studies personally reviewed by me:  Recent Results (from the past 24 hour(s))   EKG    Collection Time: 24  5:45 PM   Result Value Ref Range    Report       [facility]    Test Date:  2024  Pt Name:    HUMBLE CARLOS                  Department: ER  MRN:        [id number]                      Room:       [facility]  Gender:     Female                       Technician: [id number]  :        1960                   Requested By:ER TRIAGE PROTOCOL  Order #:    [id number]                    Reading MD: Mary Ellen Padron, " MD    Measurements  Intervals                                Axis  Rate:       95                           P:          45  VA:         178                          QRS:        -25  QRSD:       98                           T:          56  QT:         370  QTc:        465    Interpretive Statements  Sinus rhythm  Borderline left axis deviation  Borderline low voltage, extremity leads  No acute ST or T wave change  Compared to ECG 06/04/2018 03:24:04  T-wave abnormality no longer present  Electronically Signed On 03- 17:58:31 PST by Mary Ellen Padron MD     CBC with Differential    Collection Time: 03/05/24  5:47 PM   Result Value Ref Range    WBC 8.1 4.8 - 10.8 K/uL    RBC 4.48 4.20 - 5.40 M/uL    Hemoglobin 13.9 12.0 - 16.0 g/dL    Hematocrit 41.2 37.0 - 47.0 %    MCV 92.0 81.4 - 97.8 fL    MCH 31.0 27.0 - 33.0 pg    MCHC 33.7 32.2 - 35.5 g/dL    RDW 46.2 35.9 - 50.0 fL    Platelet Count 239 164 - 446 K/uL    MPV 10.1 9.0 - 12.9 fL    Neutrophils-Polys 72.20 (H) 44.00 - 72.00 %    Lymphocytes 20.60 (L) 22.00 - 41.00 %    Monocytes 6.00 0.00 - 13.40 %    Eosinophils 0.50 0.00 - 6.90 %    Basophils 0.50 0.00 - 1.80 %    Immature Granulocytes 0.20 0.00 - 0.90 %    Nucleated RBC 0.00 0.00 - 0.20 /100 WBC    Neutrophils (Absolute) 5.87 1.82 - 7.42 K/uL    Lymphs (Absolute) 1.68 1.00 - 4.80 K/uL    Monos (Absolute) 0.49 0.00 - 0.85 K/uL    Eos (Absolute) 0.04 0.00 - 0.51 K/uL    Baso (Absolute) 0.04 0.00 - 0.12 K/uL    Immature Granulocytes (abs) 0.02 0.00 - 0.11 K/uL    NRBC (Absolute) 0.00 K/uL   Complete Metabolic Panel (CMP)    Collection Time: 03/05/24  5:47 PM   Result Value Ref Range    Sodium 141 135 - 145 mmol/L    Potassium 3.9 3.6 - 5.5 mmol/L    Chloride 105 96 - 112 mmol/L    Co2 21 20 - 33 mmol/L    Anion Gap 15.0 7.0 - 16.0    Glucose 101 (H) 65 - 99 mg/dL    Bun 11 8 - 22 mg/dL    Creatinine 0.51 0.50 - 1.40 mg/dL    Calcium 9.5 8.5 - 10.5 mg/dL    Correct Calcium 9.3 8.5 - 10.5 mg/dL    AST(SGOT) 26  12 - 45 U/L    ALT(SGPT) 15 2 - 50 U/L    Alkaline Phosphatase 75 30 - 99 U/L    Total Bilirubin 0.8 0.1 - 1.5 mg/dL    Albumin 4.3 3.2 - 4.9 g/dL    Total Protein 7.3 6.0 - 8.2 g/dL    Globulin 3.0 1.9 - 3.5 g/dL    A-G Ratio 1.4 g/dL   Troponins NOW    Collection Time: 24  5:47 PM   Result Value Ref Range    Troponin T 377 (H) 6 - 19 ng/L   ESTIMATED GFR    Collection Time: 24  5:47 PM   Result Value Ref Range    GFR (CKD-EPI) 106 >60 mL/min/1.73 m 2   proBrain Natriuretic Peptide, NT    Collection Time: 24  5:47 PM   Result Value Ref Range    NT-proBNP 236 (H) 0 - 125 pg/mL   HEMOGLOBIN A1C    Collection Time: 24  5:47 PM   Result Value Ref Range    Glycohemoglobin 5.1 4.0 - 5.6 %    Est Avg Glucose 100 mg/dL   VITAMIN B12    Collection Time: 24  5:48 PM   Result Value Ref Range    Vitamin B12 -True Cobalamin 377 211 - 911 pg/mL   TSH WITH REFLEX TO FT4    Collection Time: 24  5:48 PM   Result Value Ref Range    TSH 2.170 0.380 - 5.330 uIU/mL   Troponins in two (2) hours    Collection Time: 24  7:55 PM   Result Value Ref Range    Troponin T 367 (H) 6 - 19 ng/L   POCT glucose device results    Collection Time: 24  8:08 PM   Result Value Ref Range    POC Glucose, Blood 100 (H) 65 - 99 mg/dL   EKG (NOW)    Collection Time: 24  8:11 PM   Result Value Ref Range    Report       [facility]    Test Date:  2024  Pt Name:    HUMBLE CARLOS                  Department: ER  MRN:        [id number]                      Room:        22  Gender:     Female                       Technician: [id number]  :        1960                   Requested By:JACQUI OTTO  Order #:    [id number]                    Reading MD:    Measurements  Intervals                                Axis  Rate:       60                           P:          36  SC:         169                          QRS:        -9  QRSD:       96                           T:           42  QT:         440  QTc:        440    Interpretive Statements  Sinus rhythm  Compared to ECG 03/05/2024 17:45:36  No significant changes     Prothrombin Time    Collection Time: 03/05/24  8:55 PM   Result Value Ref Range    PT 14.2 12.0 - 14.6 sec    INR 1.08 0.87 - 1.13   APTT    Collection Time: 03/05/24  8:55 PM   Result Value Ref Range    APTT 131.5 (HH) 24.7 - 36.0 sec   Heparin Anti-Xa    Collection Time: 03/05/24  8:55 PM   Result Value Ref Range    Heparin Xa (UFH) 0.61 IU/mL   CBC with Differential    Collection Time: 03/06/24  2:06 AM   Result Value Ref Range    WBC 7.1 4.8 - 10.8 K/uL    RBC 3.90 (L) 4.20 - 5.40 M/uL    Hemoglobin 12.3 12.0 - 16.0 g/dL    Hematocrit 36.7 (L) 37.0 - 47.0 %    MCV 94.1 81.4 - 97.8 fL    MCH 31.5 27.0 - 33.0 pg    MCHC 33.5 32.2 - 35.5 g/dL    RDW 48.0 35.9 - 50.0 fL    Platelet Count 170 164 - 446 K/uL    MPV 10.4 9.0 - 12.9 fL    Neutrophils-Polys 70.50 44.00 - 72.00 %    Lymphocytes 22.20 22.00 - 41.00 %    Monocytes 6.40 0.00 - 13.40 %    Eosinophils 0.30 0.00 - 6.90 %    Basophils 0.30 0.00 - 1.80 %    Immature Granulocytes 0.30 0.00 - 0.90 %    Nucleated RBC 0.00 0.00 - 0.20 /100 WBC    Neutrophils (Absolute) 4.99 1.82 - 7.42 K/uL    Lymphs (Absolute) 1.57 1.00 - 4.80 K/uL    Monos (Absolute) 0.45 0.00 - 0.85 K/uL    Eos (Absolute) 0.02 0.00 - 0.51 K/uL    Baso (Absolute) 0.02 0.00 - 0.12 K/uL    Immature Granulocytes (abs) 0.02 0.00 - 0.11 K/uL    NRBC (Absolute) 0.00 K/uL   Heparin Anti-Xa    Collection Time: 03/06/24  2:06 AM   Result Value Ref Range    Heparin Xa (UFH) 0.25 IU/mL       Imaging:  DX-CHEST-LIMITED (1 VIEW)   Final Result      No acute cardiopulmonary abnormality identified.      OUTSIDE IMAGES-DX CHEST   Final Result      EC-ECHOCARDIOGRAM COMPLETE W/O CONT    (Results Pending)           EKG tracings personally reviewed by me      Echocardiogram pending    All pertinent features of laboratory and imaging reviewed including primary images where  applicable      Principal Problem:    NSTEMI (non-ST elevated myocardial infarction) (HCC) (POA: Yes)  Active Problems:    Alcohol abuse (POA: Yes)    Class 2 severe obesity with serious comorbidity and body mass index (BMI) of 36.0 to 36.9 in adult (HCC) (Chronic) (POA: Yes)    Hypotension (POA: Unknown)  Resolved Problems:    * No resolved hospital problems. *      Assessment / Plan:  NSTEMI -given exertional nature and occurring in the setting [place] shoveling this is concerning for acute coronary syndrome also could be stress cardiomyopathy    We discussed the risk and benefits of coronary angiogram she agrees to proceed    She has already been loaded with Plavix at outside hospital  Statin  Beta-blockers  Check EF  Non-smoker      I personally discussed her case with  Dr Dinh Macias M.D.      It is my pleasure to participate in the care of Ms. Elias.  Please do not hesitate to contact me with questions or concerns.    Paxton Orozco MD PhD Located within [facility]  Cardiologist [facility]    3/6/2024    Please note that this dictation was created using voice recognition software. There may be errors I did not discover before finalizing the note.

## 2024-03-06 NOTE — ED NOTES
Pt transported to T823-02 with RN via gurney.  Pt transported with all belongings, on monitor and RA oxygen. Pt awake and oriented.  Heparin verified and continued to floor

## 2024-03-06 NOTE — ED NOTES
Med rec updated and complete. Allergies reviewed. Confirmed name and date of birth.   Pt denies outpatient antibiotic use in last 30 days Pt denies   Anticoagulant and antiplatelet medications.      Home pharmacy  [facility] = [phone]

## 2024-03-06 NOTE — ASSESSMENT & PLAN NOTE
Presents with bradycardia and hypotension.  Likely secondary to Nitropaste which . Continue to monitor    Check orthostatics

## 2024-03-06 NOTE — PROGRESS NOTES
"Ck PARK to notify MD that patient wishes to be \"bloodless\", arm band ordered and consent forms signed.  "

## 2024-03-06 NOTE — H&P
Hospital Medicine History & Physical Note    Date of Service  3/5/2024    Primary Care Physician  RAFI Burks.    Consultants  Consult cardiology in a.m.    Specialist Names:     Code Status  Full Code    Chief Complaint  Chief Complaint   Patient presents with    Chest Pain     Patient transfer from [facility]. Patient had chest pain this morning after shoveling snow. Patient trop was .4.     Patient given 4mg zofran   324 ASA  300 plavix, and had nitro paste on arrival.        History of Presenting Illness  Deja Elias is a 63 y.o. female who presented 3/5/2024 with possible history of hypertension, anxiety, alcohol abuse, gastric bypass who presents to the hospital from [facility] for chest pain that started today while she was shoveling snow.  She continues to shovel snow despite pain.  The chest pain is exertional but nonradiating.  It feels like someone sitting on her chest.  She denies any shortness of breath but does complain of nausea, vomiting, lightheadedness.  Patient does drink rum every night.  She denies smoking cigarettes.  Patient presented to hospital from [facility] where she was given Nitropaste, IV heparin and aspirin since her troponin was elevated.  Upon arrival to [facility] she was found to be hypotensive, bradycardic and dizzy.  Nitropaste was removed.  Patient states that her chest pain has now resolved.    EKG interpreted by me found normal sinus rhythm without ST segment changes      I discussed the plan of care with patient.    Review of Systems  Review of Systems   Constitutional:  Negative for chills, diaphoresis, fever and malaise/fatigue.   HENT:  Negative for congestion, ear discharge, ear pain, hearing loss, nosebleeds, sinus pain, sore throat and tinnitus.    Eyes:  Negative for blurred vision, double vision, photophobia and pain.   Respiratory:  Positive for shortness of breath. Negative for cough, hemoptysis, sputum production, wheezing and stridor.     Cardiovascular:  Positive for chest pain and leg swelling. Negative for palpitations, orthopnea, claudication and PND.   Gastrointestinal:  Positive for nausea and vomiting. Negative for abdominal pain, blood in stool, constipation, diarrhea, heartburn and melena.   Genitourinary:  Negative for dysuria, flank pain, frequency, hematuria and urgency.   Musculoskeletal:  Negative for back pain, falls, joint pain, myalgias and neck pain.   Skin:  Negative for itching and rash.   Neurological:  Positive for dizziness. Negative for tingling, tremors, weakness and headaches.   Endo/Heme/Allergies:  Negative for environmental allergies and polydipsia. Does not bruise/bleed easily.   Psychiatric/Behavioral:  Negative for depression, hallucinations, substance abuse and suicidal ideas.        Past Medical History   has a past medical history of Anxiety, Hypoglycemia, NSTEMI (non-ST elevated myocardial infarction) (HCC) (3/5/2024), and Substance abuse ([facility]).    Surgical History   has a past surgical history that includes other; pr  delivery only (); cholecystectomy; other; breast reconstruction (Bilateral, 2012); abdominoplasty (N/A, 2012); breast implant revision (Bilateral, 2012); and capsulectomy (Bilateral, 2012).     Family History  family history includes Allergies in her father and mother; Arthritis in her father and mother; Asthma in her father; Diabetes in her father and unknown relative; Stroke in her unknown relative.   Family history reviewed with patient. There is family history that is pertinent to the chief complaint.     Social History   reports that she has never smoked. She has never used smokeless tobacco. She reports that she does not drink alcohol and does not use drugs.    Allergies  Allergies   Allergen Reactions    Bloodless     Codeine Vomiting     Burns stomach, and vomit        Medications  Prior to Admission Medications   Prescriptions Last Dose Informant Patient  Reported? Taking?   Vitamin D, Ergocalciferol, 36767 units Cap 2/24/2024 at 0900  Yes Yes   Sig: Take 50,000 Units by mouth every Saturday.   prazosin (MINIPRESS) 5 MG Cap 3/2/2024 at 2200  Yes Yes   Sig: Take 5 mg by mouth every evening.   traZODone (DESYREL) 50 MG Tab 3/4/2024 at 2200  Yes Yes   Sig: Take 50-75 mg by mouth every evening.      Facility-Administered Medications: None       Physical Exam  Temp:  [36.4 °C (97.5 °F)] 36.4 °C (97.5 °F)  Pulse:  [56-93] 56  Resp:  [14-20] 20  BP: ()/(42-89) 83/58  SpO2:  [92 %-95 %] 93 %  Blood Pressure: (!) 83/58   Temperature: 36.4 °C (97.5 °F)   Pulse: (!) 56   Respiration: 20   Pulse Oximetry: 93 %       Physical Exam  Vitals and nursing note reviewed.   Constitutional:       General: She is not in acute distress.     Appearance: Normal appearance. She is not ill-appearing, toxic-appearing or diaphoretic.   HENT:      Head: Normocephalic and atraumatic.      Nose: No congestion or rhinorrhea.      Mouth/Throat:      Pharynx: No oropharyngeal exudate or posterior oropharyngeal erythema.   Eyes:      General: No scleral icterus.  Neck:      Vascular: No carotid bruit or JVD.   Cardiovascular:      Rate and Rhythm: Normal rate and regular rhythm.      Pulses: Normal pulses.      Heart sounds: Normal heart sounds. No murmur heard.     No friction rub. No gallop.   Pulmonary:      Effort: Pulmonary effort is normal. No respiratory distress.      Breath sounds: No stridor. No wheezing, rhonchi or rales.   Abdominal:      General: Abdomen is flat. There is no distension.      Palpations: There is no mass.      Tenderness: There is no abdominal tenderness. There is no left CVA tenderness, guarding or rebound.      Hernia: No hernia is present.   Musculoskeletal:         General: No swelling. Normal range of motion.      Cervical back: No rigidity. No muscular tenderness.      Right lower leg: Edema present.      Left lower leg: Edema present.   Lymphadenopathy:       "Cervical: No cervical adenopathy.   Skin:     General: Skin is warm and dry.      Capillary Refill: Capillary refill takes more than 3 seconds.      Coloration: Skin is not jaundiced or pale.      Findings: No bruising or erythema.   Neurological:      Mental Status: She is alert.         Laboratory:  Recent Labs     03/05/24  1747   WBC 8.1   RBC 4.48   HEMOGLOBIN 13.9   HEMATOCRIT 41.2   MCV 92.0   MCH 31.0   MCHC 33.7   RDW 46.2   PLATELETCT 239   MPV 10.1     Recent Labs     03/05/24  1747   SODIUM 141   POTASSIUM 3.9   CHLORIDE 105   CO2 21   GLUCOSE 101*   BUN 11   CREATININE 0.51   CALCIUM 9.5     Recent Labs     03/05/24  1747   ALTSGPT 15   ASTSGOT 26   ALKPHOSPHAT 75   TBILIRUBIN 0.8   GLUCOSE 101*         No results for input(s): \"NTPROBNP\" in the last 72 hours.      Recent Labs     03/05/24 1747   TROPONINT 377*       Imaging:  OUTSIDE IMAGES-DX CHEST   Final Result      EC-ECHOCARDIOGRAM COMPLETE W/O CONT    (Results Pending)   DX-CHEST-LIMITED (1 VIEW)    (Results Pending)       X-Ray:  I have personally reviewed the images and compared with prior images.  EKG:  I have personally reviewed the images and compared with prior images.    Assessment/Plan:  Justification for Admission Status  I anticipate this patient will require at least two midnights for appropriate medical management, necessitating inpatient admission because NSTEMI    Patient will need a Telemetry bed on MEDICAL service .  The need is secondary to NSTEMI.    * NSTEMI (non-ST elevated myocardial infarction) (HCC)- (present on admission)  Assessment & Plan  Patient will be admitted to the telemetry unit with close cardiac monitoring, serial EKG and troponin  Patient has been given full dose of aspirin and is started on IV heparin, monitor APTT  I will start the patient on atorvastatin 40 mg daily  Check 2D echo, lipid panel, TSH and hemoglobin A1c  Nitro and morphine when necessary for chest pain        Hypotension  Assessment & " Plan  Presents with bradycardia and hypotension.  Likely secondary to Nitropaste which was removed.  Continue to monitor    Alcohol abuse- (present on admission)  Assessment & Plan  No signs of alcohol withdrawal        VTE prophylaxis: SCDs/TEDs        Patient is critically ill.   The patient continues to have: NSTEMI  The vital organ system that is affected is the: Cardiac  If untreated there is a high chance of deterioration into: Cardiac arrest  And eventually death.   The critical care that I am providing today is: IV heparin and medical management  The critical that has been undertaken is medically complex.   There has been no overlap in critical care time.   Critical Care Time not including procedures: 60 minutes

## 2024-03-06 NOTE — THERAPY
Physical Therapy Contact Note    Patient Name: Deja Elias  Age:  63 y.o., Sex:  female  Medical Record #: [id number]  Today's Date: 3/6/2024    PT cardiac rehab orders received. Pt pending coronary angiogram. Will hold and follow up post-procedure as appropriate.    Arsenio Griffin PT, DPT

## 2024-03-06 NOTE — ED NOTES
Pt became hypotensive and bradycardic, reporting diaphoresis and lethargy. ERP called to room. 2nd PIV established, bolus 500mL initiated.  [id number]-100

## 2024-03-06 NOTE — ED NOTES
Bedside report from XAVIER Chaves. Pt resting in rCurtis comfortably, on monitor, call light in reach.  Pt is on RA, GCS 15.  Necessary fall precautions in place.

## 2024-03-06 NOTE — ED PROVIDER NOTES
ED Provider Note    CHIEF COMPLAINT  Chief Complaint   Patient presents with    Chest Pain     Patient transfer from [facility]. Patient had chest pain this morning after shoveling snow. Patient trop was .4.     Patient given 4mg zofran   324 ASA  300 plavix, and had nitro paste on arrival.      EXTERNAL RECORDS REVIEWED  Transferred from outside hospital due to concern for NSTEMI. Initial troponin 0.47. She was given aspirin and plavix as well as nitroglycerin paste    HPI/ROS  LIMITATION TO HISTORY   Select: : None  OUTSIDE HISTORIAN(S):  None    Deja Elias is a 63 y.o. female who presents to the Emergency Department with chest pain onset today. The patient was shoveling snow and began to experience her symptoms. The pain is described as someone sitting on her chest and as something she has never experienced before.  No significant shortness of breath, leg swelling.  She did have some nausea earlier.  She reports hypoglycemia, but denies hypertension, hyperlipidemia, or history of blood clots. The patient reports taking 2000 mg of Vitamin D a day. She requests pain medications but says it cannot be administered orally due to having gastric bypass surgery.     PAST MEDICAL HISTORY  Past Medical History:   Diagnosis Date    Anxiety     Hypoglycemia     NSTEMI (non-ST elevated myocardial infarction) (HCC) 3/5/2024    Substance abuse (HCC)         SURGICAL HISTORY  Past Surgical History:   Procedure Laterality Date    BREAST RECONSTRUCTION Bilateral 8/28/2012    Procedure: WITH REPOSITIONING OF IMFRAMMARY FOLDS;  Surgeon: Hilario Wilhelm M.D.;  Location: [facility];  Service:     ABDOMINOPLASTY N/A 8/28/2012    Procedure: REVERSE ;  Surgeon: Hilario Wilhelm M.D.;  Location: [facility];  Service:     BREAST IMPLANT REVISION Bilateral 8/28/2012    Procedure: EXCHANGE TO LARGER SALINE IMPLANTS;  Surgeon: Hilario Wilhelm M.D.;  Location: [facility];  Service:      "CAPSULECTOMY Bilateral 2012    Procedure: CAPSULECTOMY;  Surgeon: Hilario Wilhelm M.D.;  Location: SUR[facility];  Service:     SD  DELIVERY ONLY      CHOLECYSTECTOMY      OTHER      cosmetic arms, legs, breast, stomach    OTHER      ablation (uterine)        FAMILY HISTORY  Family History   Problem Relation Age of Onset    Diabetes Unknown     Stroke Unknown     Allergies Mother     Arthritis Mother     Allergies Father     Arthritis Father     Asthma Father     Diabetes Father        SOCIAL HISTORY   reports that she has never smoked. She has never used smokeless tobacco. She reports that she does not drink alcohol and does not use drugs.    CURRENT MEDICATIONS  Current Discharge Medication List        CONTINUE these medications which have NOT CHANGED    Details   prazosin (MINIPRESS) 5 MG Cap Take 5 mg by mouth every evening.      traZODone (DESYREL) 50 MG Tab Take 50-75 mg by mouth every evening.      Vitamin D, Ergocalciferol, 96249 units Cap Take 50,000 Units by mouth every Saturday.             ALLERGIES  Bloodless and Codeine    PHYSICAL EXAM  BP (!) 153/89   Pulse 93   Temp 36.4 °C (97.5 °F) (Temporal)   Resp 18   Ht 1.6 m (5' 3\")   Wt 90.7 kg (200 lb)   SpO2 93%      Constitutional: Nontoxic appearing. Alert in no apparent distress.  HENT: Normocephalic, Atraumatic. Bilateral external ears normal. Nose normal.  Moist mucous membranes.  Oropharynx clear.  Eyes: Pupils are equal and reactive. Conjunctiva normal.   Neck: Supple, full range of motion  Heart: Regular rate and rhythm.  No murmurs.    Lungs: No respiratory distress, normal work of breathing. Lungs clear to auscultation bilaterally.  Abdomen Soft, no distention.  No tenderness to palpation.  Musculoskeletal: Atraumatic. No obvious deformities noted.  No lower extremity edema.  Skin: Warm, Dry.  No erythema, No rash.   Neurologic: Alert and oriented x3. Moving all extremities spontaneously without focal " deficits.  Psychiatric: Affect normal, Mood normal, Appears appropriate and not intoxicated.       DIAGNOSTIC STUDIES / PROCEDURES    EKG  I have independently interpreted this EKG  Results for orders placed or performed during the hospital encounter of 24   EKG   Result Value Ref Range    Report       [facility] Emergency Dept.    Test Date:  2024  Pt Name:    HUMBLE CARLOS                  Department: ER  MRN:        [id number]                      Room:       BL 22  Gender:     Female                       Technician: [id number]  :        1960                   Requested By:ER TRIAGE PROTOCOL  Order #:    [id number]                    Reading MD: Mary Ellen Padron MD    Measurements  Intervals                                Axis  Rate:       95                           P:          45  MI:         178                          QRS:        -25  QRSD:       98                           T:          56  QT:         370  QTc:        465    Interpretive Statements  Sinus rhythm  Borderline left axis deviation  Borderline low voltage, extremity leads  No acute ST or T wave change  Compared to ECG 2018 03:24:04  T-wave abnormality no longer present  Electronically Signed On 2024 17:58:31 PST by Mary Ellen Padron MD     EKG (NOW)   Result Value Ref Range    Report       [facility] Emergency Dept.    Test Date:  2024  Pt Name:    HUMBLE CARLOS                  Department: ER  MRN:        [id number]                      Room:       BL 22  Gender:     Female                       Technician: [id number]  :        1960                   Requested By:JACQUI OTTO  Order #:    [id number]                    Reading MD:    Measurements  Intervals                                Axis  Rate:       60                           P:          36  MI:         169                          QRS:        -9  QRSD:       96                           T:          42  QT:          440  QTc:        440    Interpretive Statements  Sinus rhythm  Compared to ECG 03/05/2024 17:45:36  No significant changes          LABS  Labs Reviewed   CBC WITH DIFFERENTIAL - Abnormal; Notable for the following components:       Result Value    Neutrophils-Polys 72.20 (*)     Lymphocytes 20.60 (*)     All other components within normal limits    Narrative:     Biotin intake of greater than 5 mg per day may interfere with  troponin levels, causing false low values.   COMP METABOLIC PANEL - Abnormal; Notable for the following components:    Glucose 101 (*)     All other components within normal limits    Narrative:     Biotin intake of greater than 5 mg per day may interfere with  troponin levels, causing false low values.   TROPONIN - Abnormal; Notable for the following components:    Troponin T 377 (*)     All other components within normal limits    Narrative:     Biotin intake of greater than 5 mg per day may interfere with  troponin levels, causing false low values.   TROPONIN - Abnormal; Notable for the following components:    Troponin T 367 (*)     All other components within normal limits    Narrative:     Biotin intake of greater than 5 mg per day may interfere with  troponin levels, causing false low values.   APTT - Abnormal; Notable for the following components:    APTT 131.5 (*)     All other components within normal limits   PROBRAIN NATRIURETIC PEPTIDE, NT - Abnormal; Notable for the following components:    NT-proBNP 236 (*)     All other components within normal limits    Narrative:     Biotin intake of greater than 5 mg per day may interfere with  troponin levels, causing false low values.   POCT GLUCOSE DEVICE RESULTS - Abnormal; Notable for the following components:    POC Glucose, Blood 100 (*)     All other components within normal limits   ESTIMATED GFR    Narrative:     Biotin intake of greater than 5 mg per day may interfere with  troponin levels, causing false low values.   VITAMIN B12    TSH WITH REFLEX TO FT4   PROTHROMBIN TIME   HEPARIN XA (UNFRACTIONATED)   HEMOGLOBIN A1C    Narrative:     Biotin intake of greater than 5 mg per day may interfere with  troponin levels, causing false low values.   CBC WITH DIFFERENTIAL   COMP METABOLIC PANEL   LIPID PROFILE        RADIOLOGY  I have independently interpreted the diagnostic imaging associated with this visit and am waiting the final reading from the radiologist.   My preliminary interpretation is a follows: \no infiltrate  Radiologist interpretation:   DX-CHEST-LIMITED (1 VIEW)   Final Result      No acute cardiopulmonary abnormality identified.      OUTSIDE IMAGES-DX CHEST   Final Result      EC-ECHOCARDIOGRAM COMPLETE W/O CONT    (Results Pending)        COURSE & MEDICAL DECISION MAKING  6:21 PM - Patient was evaluated at bedside. Ordered for EKG, DX-Chest, CBC with diff, troponin, and CMP.     ED Observation Status? No; Patient does not meet criteria for ED Observation.     INITIAL ASSESSMENT, COURSE AND PLAN  Care Narrative: Middle aged patient who presents as transfer from outside hospital due to concern for NSTEMI with elevated troponin.  She has reassuring vitals on arrival.  Pain is improved but still present.  Her EKG does not show obvious ischemia or dysrhythmia.  Initial troponin here is 377.  Patient was already given ASA and plavix.  Heparin drip will be started.  History and exam is not concerning for aortic dissection or pulmonary embolism.  Chest xray without pneumonia or pulmonary edema.  No renal dysfunction or electrolyte abnormality. Plan to admit for monitoring and cardiology consultation in AM.    8:30PM - I was called into the room as patient became hypotensive in the 60s and bradycardic in the 40s.  Appears to have vagal response.  She developed severe burning abdominal pain which has happened when she has taken oral opiate medications in the past however never IV.  EKG was repeated without new signs of ischemia or  arrhyhtmia such as heart block.  She was given GI cocktail with improvement of symptoms.  Discussed plan of care for admission.  Patient is agreeable.    ADDITIONAL PROBLEM LIST  Problem #1: Acute NSTEMI - heparin drip imitated, monitor with likely cardiac catheterization tomorrow      DISPOSITION AND DISCUSSIONS  I have discussed management of the patient with the following physicians and VIRY's:   Dr. Hernandez, hospitalist on call    CRITICAL CARE  The very real possibilty of a deterioration of this patient's condition required the highest level of my preparedness for sudden, emergent intervention.  I provided critical care services, which included medication orders, frequent reevaluations of the patient's condition and response to treatment, ordering and reviewing test results, and discussing the case with various consultants.  The critical care time associated with the care of the patient was 38 minutes. Review chart for interventions. This time is exclusive of any other billable procedures.       DISPOSITION:  Patient will be hospitalized by Dr. Hernandez in guarded condition.      FINAL DIAGNOSIS  1. NSTEMI (non-ST elevated myocardial infarction) (HCC)        The note accurately reflects work and decisions made by me.  Mary Ellen Padron M.D.  3/6/2024  12:06 AM     Graham MARTINEZ (Richard), am scribing for, and in the presence of, Mary Ellen Padron M.D..    Electronically signed by: Graham Lakhani (Richard), 3/5/2024    Mary Ellen MARTINEZ M.D. personally performed the services described in this documentation, as scribed by Graham Lkahani in my presence, and it is both accurate and complete.

## 2024-03-06 NOTE — PROGRESS NOTES
4 Eyes Skin Assessment Completed by XAVIER Arnett and SARI Hanson.    Head WDL  Ears WDL  Nose WDL  Mouth WDL  Neck WDL  Breast/Chest WDL  Shoulder Blades WDL  Spine WDL  (R) Arm/Elbow/Hand WDL  (L) Arm/Elbow/Hand WDL  Abdomen WDL  Groin WDL  Scrotum/Coccyx/Buttocks WDL  (R) Leg WDL  (L) Leg WDL  (R) Heel/Foot/Toe Redness and Blanching  (L) Heel/Foot/Toe Redness and Blanching          Devices In Places Tele Box, Blood Pressure Cuff, and Pulse Ox      Interventions In Place Pillows, Low Air Loss Mattress, and Heels Loaded W/Pillows    Possible Skin Injury No    Pictures Uploaded Into Epic N/A  Wound Consult Placed N/A  RN Wound Prevention Protocol Ordered Yes

## 2024-03-07 VITALS
BODY MASS INDEX: 36.41 KG/M2 | HEIGHT: 63 IN | WEIGHT: 205.47 LBS | OXYGEN SATURATION: 95 % | SYSTOLIC BLOOD PRESSURE: 127 MMHG | DIASTOLIC BLOOD PRESSURE: 78 MMHG | TEMPERATURE: 97.2 F | RESPIRATION RATE: 18 BRPM | HEART RATE: 69 BPM

## 2024-03-07 LAB
ANION GAP SERPL CALC-SCNC: 11 MMOL/L (ref 7–16)
BUN SERPL-MCNC: 12 MG/DL (ref 8–22)
CALCIUM SERPL-MCNC: 8.8 MG/DL (ref 8.5–10.5)
CHLORIDE SERPL-SCNC: 107 MMOL/L (ref 96–112)
CO2 SERPL-SCNC: 23 MMOL/L (ref 20–33)
CREAT SERPL-MCNC: 0.52 MG/DL (ref 0.5–1.4)
ERYTHROCYTE [DISTWIDTH] IN BLOOD BY AUTOMATED COUNT: 47.7 FL (ref 35.9–50)
GFR SERPLBLD CREATININE-BSD FMLA CKD-EPI: 105 ML/MIN/1.73 M 2
GLUCOSE SERPL-MCNC: 86 MG/DL (ref 65–99)
HCT VFR BLD AUTO: 37.5 % (ref 37–47)
HGB BLD-MCNC: 12.4 G/DL (ref 12–16)
MCH RBC QN AUTO: 30.8 PG (ref 27–33)
MCHC RBC AUTO-ENTMCNC: 33.1 G/DL (ref 32.2–35.5)
MCV RBC AUTO: 93.1 FL (ref 81.4–97.8)
PLATELET # BLD AUTO: 209 K/UL (ref 164–446)
PMV BLD AUTO: 10 FL (ref 9–12.9)
POTASSIUM SERPL-SCNC: 4.2 MMOL/L (ref 3.6–5.5)
RBC # BLD AUTO: 4.03 M/UL (ref 4.2–5.4)
SODIUM SERPL-SCNC: 141 MMOL/L (ref 135–145)
WBC # BLD AUTO: 5.3 K/UL (ref 4.8–10.8)

## 2024-03-07 PROCEDURE — 700102 HCHG RX REV CODE 250 W/ 637 OVERRIDE(OP): Performed by: PHYSICIAN ASSISTANT

## 2024-03-07 PROCEDURE — 99232 SBSQ HOSP IP/OBS MODERATE 35: CPT | Performed by: PHYSICIAN ASSISTANT

## 2024-03-07 PROCEDURE — 85027 COMPLETE CBC AUTOMATED: CPT

## 2024-03-07 PROCEDURE — A9270 NON-COVERED ITEM OR SERVICE: HCPCS | Performed by: HOSPITALIST

## 2024-03-07 PROCEDURE — 700102 HCHG RX REV CODE 250 W/ 637 OVERRIDE(OP): Performed by: HOSPITALIST

## 2024-03-07 PROCEDURE — 97535 SELF CARE MNGMENT TRAINING: CPT

## 2024-03-07 PROCEDURE — 99239 HOSP IP/OBS DSCHRG MGMT >30: CPT | Performed by: HOSPITALIST

## 2024-03-07 PROCEDURE — 80048 BASIC METABOLIC PNL TOTAL CA: CPT

## 2024-03-07 PROCEDURE — A9270 NON-COVERED ITEM OR SERVICE: HCPCS | Performed by: PHYSICIAN ASSISTANT

## 2024-03-07 RX ORDER — ASPIRIN 81 MG/1
81 TABLET ORAL DAILY
Qty: 100 TABLET | Refills: 3 | Status: SHIPPED | OUTPATIENT
Start: 2024-03-08

## 2024-03-07 RX ORDER — CLOPIDOGREL BISULFATE 75 MG/1
75 TABLET ORAL DAILY
Qty: 30 TABLET | Refills: 3 | Status: SHIPPED | OUTPATIENT
Start: 2024-03-08

## 2024-03-07 RX ORDER — ATORVASTATIN CALCIUM 40 MG/1
40 TABLET, FILM COATED ORAL EVERY EVENING
Qty: 30 TABLET | Refills: 3 | Status: SHIPPED | OUTPATIENT
Start: 2024-03-07

## 2024-03-07 RX ORDER — ASPIRIN 81 MG/1
81 TABLET ORAL DAILY
Qty: 100 TABLET | Refills: 3 | Status: SHIPPED | OUTPATIENT
Start: 2024-03-08 | End: 2024-03-07

## 2024-03-07 RX ORDER — OMEPRAZOLE 20 MG/1
20 CAPSULE, DELAYED RELEASE ORAL DAILY
Qty: 30 CAPSULE | Refills: 3 | Status: SHIPPED | OUTPATIENT
Start: 2024-03-08

## 2024-03-07 RX ORDER — CLOPIDOGREL BISULFATE 75 MG/1
75 TABLET ORAL DAILY
Status: DISCONTINUED | OUTPATIENT
Start: 2024-03-07 | End: 2024-03-07 | Stop reason: HOSPADM

## 2024-03-07 RX ORDER — ENALAPRIL MALEATE 2.5 MG/1
2.5 TABLET ORAL 2 TIMES DAILY
Qty: 60 TABLET | Refills: 3 | Status: SHIPPED | OUTPATIENT
Start: 2024-03-07 | End: 2024-03-07

## 2024-03-07 RX ORDER — ENALAPRIL MALEATE 2.5 MG/1
2.5 TABLET ORAL 2 TIMES DAILY
Qty: 60 TABLET | Refills: 3 | Status: SHIPPED | OUTPATIENT
Start: 2024-03-07

## 2024-03-07 RX ORDER — ATORVASTATIN CALCIUM 40 MG/1
40 TABLET, FILM COATED ORAL EVERY EVENING
Qty: 30 TABLET | Refills: 3 | Status: SHIPPED | OUTPATIENT
Start: 2024-03-07 | End: 2024-03-07

## 2024-03-07 RX ADMIN — ASPIRIN 81 MG: 81 TABLET, COATED ORAL at 04:05

## 2024-03-07 RX ADMIN — CLOPIDOGREL BISULFATE 75 MG: 75 TABLET ORAL at 07:57

## 2024-03-07 RX ADMIN — OMEPRAZOLE 20 MG: 20 CAPSULE, DELAYED RELEASE ORAL at 04:05

## 2024-03-07 ASSESSMENT — ENCOUNTER SYMPTOMS
CARDIOVASCULAR NEGATIVE: 1
GASTROINTESTINAL NEGATIVE: 1
CONSTIPATION: 0
NEUROLOGICAL NEGATIVE: 1
PSYCHIATRIC NEGATIVE: 1
COUGH: 0
LIGHT-HEADEDNESS: 0
FEVER: 0
HEMATOLOGIC/LYMPHATIC NEGATIVE: 1
MUSCULOSKELETAL NEGATIVE: 1
BRUISES/BLEEDS EASILY: 0
CONSTITUTIONAL NEGATIVE: 1
DIARRHEA: 0
CHILLS: 0
ENDOCRINE NEGATIVE: 1
DIAPHORESIS: 0
PALPITATIONS: 0
NAUSEA: 0
RESPIRATORY NEGATIVE: 1
VOMITING: 0
EYES NEGATIVE: 1
FATIGUE: 0
CHEST TIGHTNESS: 0
ALLERGIC/IMMUNOLOGIC NEGATIVE: 1
DIZZINESS: 0
SHORTNESS OF BREATH: 0

## 2024-03-07 ASSESSMENT — COGNITIVE AND FUNCTIONAL STATUS - GENERAL
SUGGESTED CMS G CODE MODIFIER MOBILITY: CH
SUGGESTED CMS G CODE MODIFIER DAILY ACTIVITY: CH
MOBILITY SCORE: 24
DAILY ACTIVITIY SCORE: 24

## 2024-03-07 ASSESSMENT — FIBROSIS 4 INDEX: FIB4 SCORE: 3.15

## 2024-03-07 NOTE — PROGRESS NOTES
Bedside report received from off going RN/tech: Ck, assumed care of patient.     Fall Risk Score: LOW RISK  Fall risk interventions in place: Provide patient/family education based on risk assessment, Educate patient/family to call staff for assistance when getting out of bed, Place fall precaution signage outside patient door, and Place patient in room close to nursing station  Bed type: Regular (Mendoza Score less than 17 interventions in place)  Patient on cardiac monitor: Yes  IVF/IV medications: Not Applicable   Oxygen: Room Air  Bedside sitter: Not Applicable   Isolation: Not applicable

## 2024-03-07 NOTE — THERAPY
Physical Therapy Contact Note    Patient Name: Deja Elias  Age:  63 y.o., Sex:  female  Medical Record #: [id number]  Today's Date: 3/7/2024    Patient received in bed and agreeable to PT session. Extensive time taken discussing cardiac rehab handout including home walking program, activity pacing, rest breaks, talk test, RPE scale, HR goals, modifiable/non-modifiable risk factors, and outpatient cardiac rehab. Pt and family receptive to education; extensive time taken answering all questions. Pt politely declined OOB mobility as she has been up numerous times with RN with no AD. Anticipate pt can return home and follow up with a cardiac rehab program as appropriate; pt and family agreeable. No further acute care PT needs at this time.        03/07/24 0808   Initial Contact Note    Initial Contact Note Order Received and Verified, Evaluation Only - Patient Does Not Require Further Acute Physical Therapy at this Time.  However, May Benefit from Post Acute Therapy for Higher Level Functional Deficits.   Precautions   Precautions Fall Risk   Vitals   O2 (LPM) 0   O2 Delivery Device None - Room Air   Pain 0 - 10 Group   Therapist Pain Assessment Post Activity Pain Same as Prior to Activity;Nurse Notified  (pt reports no pain at all)   Prior Living Situation   Prior Services Home-Independent   Housing / Facility 1 Story House   Steps Into Home 2   Steps In Home 0   Equipment Owned None   Lives with - Patient's Self Care Capacity Child Less than 18 Years of Age  (grandson)   Comments pt reports son is nearby and able to assist as needed   Prior Level of Functional Mobility   Bed Mobility Independent   Transfer Status Independent   Ambulation Independent   Ambulation Distance community   Assistive Devices Used None   Stairs Independent   Cognition    Cognition / Consciousness WDL   Level of Consciousness Alert   Comments very pleasant and cooperative, receptive to education   Education Group   Education Provided Role of  Physical Therapist   Role of Physical Therapist Patient Response Patient;Family;Acceptance;Explanation;Verbal Demonstration   Additional Comments Cardiac rehab handout given and discussed with pt and family   Physical Therapy Initial Treatment Plan    Duration Evaluation only   Anticipated Discharge Equipment and Recommendations   Discharge Recommendations Other -  (pt is interested in cardiac rehab; [facility] is [facility] [place] [facility]   Interdisciplinary Plan of Care Collaboration   IDT Collaboration with  Nursing   Collaboration Comments RN updated   Session Information   Date / Session Number  3/7- cardiac rehab eval, education only

## 2024-03-07 NOTE — PROGRESS NOTES
Patient being discharged. Pt educated on discharge instructions and new prescriptions, verbalized understanding. Follow up appointment made with PCP. PIV removed, monitor checked in. Patient going Discharge Lounge  via Wheelchair

## 2024-03-07 NOTE — PROGRESS NOTES
Bedside report received from off going RN/tech: Jamila , assumed care of patient.     Fall Risk Score: LOW RISK  Fall risk interventions in place: Place yellow fall risk ID band on patient, Provide patient/family education based on risk assessment, Educate patient/family to call staff for assistance when getting out of bed, Place fall precaution signage outside patient door, Place patient in room close to nursing station, Utilize bed/chair fall alarm, and Bed alarm connected correctly  Bed type: Low Air Loss (Mendoza Score less than 17 interventions in place)  Patient on cardiac monitor: Yes  IVF/IV medications: Not Applicable   Oxygen: Room Air  Bedside sitter: Not Applicable   Isolation: Not applicable

## 2024-03-07 NOTE — PROCEDURES
Cardiac Catheterization Laboratory Procedure Note    DATE: 3/6/2024    : Micah Barber MD, MPH    PROCEDURES PERFORMED:  Left heart catheterization  Coronary angiography  Ultrasound-guided right radial arterial access  Moderate conscious sedation    INDICATIONS:  NSTEMI    CONSENT:  The complete alternatives, risks, and benefits of the procedure were explained to the patient. Informed consent was obtained prior to the procedure.    MEDICATIONS:  Lidocaine  Fentanyl  Midazolam  Verapamil  Heparin    MODERATE CONSCIOUS SEDATION:  I personally supervised the administration of moderate conscious sedation by the nursing staff for 12 minutes.  Start time: 1601  End time: 1613    CONTRAST: Omnipaque 10 cc    RADIATION DOSE (Air Kerma): 76.3 mGy    FLUOROSCOPY TIME: 0.9 minutes    ACCESS:  6-Azeri Glidesheath in the right radial artery    ESTIMATED BLOOD LOSS: <10 cc    COMPLICATIONS: None    PROCEDURE IN DETAIL:  The patient was brought to the cardiac catheterization laboratory in the fasting state. The skin over the right wrist was prepped and draped in the usual sterile fashion. Lidocaine infiltration was used to anesthetize the tissue over the right radial artery. Using the micropuncture technique, under ultrasound guidance, a 6-Azeri Glidesheath was inserted in the right radial artery. A 5-Fr Terumo Tiger diagnostic catheter was then advanced over a standard J-wire into the left ventricular cavity where it was gently aspirated, flushed, and then withdrawn across the aortic valve with sequential pressures measured. This catheter was then used to engage the ostium of the left coronary artery and cineangiograms were obtained in multiple projections for complete evaluation of the left coronary system. This catheter was then used to engage the ostium of the right coronary artery and cineangiograms were obtained in multiple projections for complete evaluation of the right coronary system. Following completion of  coronary angiography, all wires, catheters, and sheaths were removed.  A TR band was placed over the right wrist using the patent hemostasis technique.     HEMODYNAMICS:  Aortic pressure: 111/85/98 mmHg  LVEDP: 30 mmHg  No significant aortic gradient on pullback    CORONARY ANGIOGRAPHY:  The left main coronary artery : Large in caliber normal-appearing vessel that bifurcates to LAD and left circumflex  The left anterior descending coronary artery : Normal-appearing large in caliber none transapical vessel that gives rise to 2 large diagonal branches.  The left circumflex coronary artery : Normal-appearing large in caliber vessel that gives rise to a large OM  The right coronary artery  : Large in caliber dominant vessel with RCA that reaches the apex.    IMPRESSION:  1.  Normal-appearing epicardial coronary arteries, dominant RCA, non-transapical LAD  2.  Consider either coronary vasospasm or stress-induced /Takotsubo related NSTEMI presentation  3.  Significantly elevated resting LVEDP 30 mmHg with no significant transaortic gradient on pullback.        NOTIFICATION:  The patient's family were notified of the results in the waiting area.    Referring Cardiologist - Dr. Orozco- was notified.    Micah Barber MD, MPH [facility]  Interventional Cardiologist  [facility]   of Clinical Internal Medicine - [facility]

## 2024-03-07 NOTE — CARE PLAN
The patient is Stable - Low risk of patient condition declining or worsening    Shift Goals  Clinical Goals: monitor radial site, safety  Patient Goals: discharge    Progress made toward(s) clinical / shift goals:      Problem: Pain - Standard  Goal: Alleviation of pain or a reduction in pain to the patient’s comfort goal  3/7/2024 0243 by Jamila Rausch, R.N.  Outcome: Progressing  Note: Non pharm pain relieving methods are being used appropriately  3/7/2024 0242 by Jamila Rausch R.N.  Outcome: Progressing  Note: Non pharm pain relieving methods in place.      Problem: Knowledge Deficit - Standard  Goal: Patient and family/care givers will demonstrate understanding of plan of care, disease process/condition, diagnostic tests and medications  3/7/2024 0243 by Jamila Rausch, R.N.  Outcome: Progressing  Note: Discussed POC with patient.  3/7/2024 0242 by Jamila Rausch, R.N.  Outcome: Progressing  Note: Discussed POC with patient.       Patient is not progressing towards the following goals:

## 2024-03-08 NOTE — DISCHARGE SUMMARY
Discharge Summary    CHIEF COMPLAINT ON ADMISSION  Chief Complaint   Patient presents with    Chest Pain     Patient transfer from [facility]. Patient had chest pain this morning after shoveling snow. Patient trop was .4.     Patient given 4mg zofran   324 ASA  300 plavix, and had nitro paste on arrival.        Reason for Admission  ems     Admission Date  3/5/2024    CODE STATUS  Prior    HPI & HOSPITAL COURSE    Deja Elias is a 63 y.o. female who presented 3/5/2024 with possible history of hypertension, anxiety, alcohol abuse, gastric bypass who presents to the hospital from [facility] for chest pain that started today while she was shoveling snow.  She continues to shovel snow despite pain.  The chest pain is exertional but nonradiating.  It feels like someone sitting on her chest.  She denies any shortness of breath but does complain of nausea, vomiting, lightheadedness.  Patient does drink rum every night.  She denies smoking cigarettes.  Patient presented to hospital from [facility] where she was given Nitropaste, IV heparin and aspirin since her troponin was elevated.  Upon arrival to [facility] she was found to be hypotensive, bradycardic and dizzy.  Nitropaste was removed.  Patient states that her chest pain has now resolved.     EKG interpreted by me found normal sinus rhythm without ST segment changes       Patient was monitored overnight.  Patient troponins were elevated.  The first night that she was in her hospital she had a transient episode of chest pain that resolved on its own.  Patient underwent a cardiac catheterization did not show any evidence of any obstructive lesions that required immediate intervention.  Patient was cleared for discharge home with the below medications.  Patient will be on dual antiplatelet therapy for 12 months    Therefore, she is discharged in good and stable condition to home with close outpatient follow-up.    The patient met 2-midnight criteria for an  inpatient stay at the time of discharge.    Discharge Date  3/7/2024    FOLLOW UP ITEMS POST DISCHARGE  [facility] cards    DISCHARGE DIAGNOSES  Principal Problem:    NSTEMI (non-ST elevated myocardial infarction) (HCC) (POA: Yes)  Active Problems:    Alcohol abuse (POA: Yes)    Class 2 severe obesity with serious comorbidity and body mass index (BMI) of 36.0 to 36.9 in adult (HCC) (Chronic) (POA: Yes)    Hypotension (POA: Yes)  Resolved Problems:    * No resolved hospital problems. *      FOLLOW UP  Future Appointments   Date Time Provider Department Center   3/14/2024  1:45 PM JESSICA Humphrey CARCABIGAIL None     AROLDO Burks  [address] [facility] [address] [phone]    Schedule an appointment as soon as possible for a visit in 1 week(s)        MEDICATIONS ON DISCHARGE     Medication List        START taking these medications        Instructions   aspirin 81 MG EC tablet   Take 1 Tablet by mouth every day.  Dose: 81 mg     atorvastatin 40 MG Tabs  Commonly known as: Lipitor   Take 1 Tablet by mouth every evening.  Dose: 40 mg     clopidogrel 75 MG Tabs  Commonly known as: Plavix   Take 1 Tablet by mouth every day.  Dose: 75 mg     enalapril 2.5 MG Tabs  Commonly known as: Vasotec   Take 1 Tablet by mouth 2 times a day.  Dose: 2.5 mg     omeprazole 20 MG delayed-release capsule  Commonly known as: PriLOSEC   Take 1 Capsule by mouth every day.  Dose: 20 mg            CONTINUE taking these medications        Instructions   prazosin 5 MG Caps  Commonly known as: Minipress   Take 5 mg by mouth every evening.  Dose: 5 mg     traZODone 50 MG Tabs  Commonly known as: Desyrel   Take 50-75 mg by mouth every evening.  Dose: 50-75 mg     Vitamin D (Ergocalciferol) 40342 units Caps   Take 50,000 Units by mouth every Saturday.  Dose: 50,000 Units              Allergies  Allergies   Allergen Reactions    Bloodless     Codeine Vomiting     Burns stomach, and vomit        DIET  No orders of the defined types  were placed in this encounter.      ACTIVITY  As tolerated.  Weight bearing as tolerated    CONSULTATIONS  Sergio cards  Ernesto cards    PROCEDURES      [address] [place] [address]       Deja Elias   MRN: [id number], : 1960 , Sex: F   Admit: 3/5/2024, D/C: 3/7/2024          Micah Barber M.D.  Physician  Interventional Cardiology     Procedures     Signed     Date of Service: 3/6/2024  4:20 PM                     Show:Clear all  [x]Written[x]Templated[]Copied    Added by:  [x]Micah Barber M.D.      []Francis for details    Cardiac Catheterization Laboratory Procedure Note     DATE: 3/6/2024     : Micah Barber MD, MPH     PROCEDURES PERFORMED:  Left heart catheterization  Coronary angiography  Ultrasound-guided right radial arterial access  Moderate conscious sedation     INDICATIONS:  NSTEMI     CONSENT:  The complete alternatives, risks, and benefits of the procedure were explained to the patient. Informed consent was obtained prior to the procedure.     MEDICATIONS:  Lidocaine  Fentanyl  Midazolam  Verapamil  Heparin     MODERATE CONSCIOUS SEDATION:  I personally supervised the administration of moderate conscious sedation by the nursing staff for 12 minutes.  Start time: 1601  End time: 1613     CONTRAST: Omnipaque 10 cc     RADIATION DOSE (Air Kerma): 76.3 mGy     FLUOROSCOPY TIME: 0.9 minutes     ACCESS:  6-Stateless Glidesheath in the right radial artery     ESTIMATED BLOOD LOSS: <10 cc     COMPLICATIONS: None     PROCEDURE IN DETAIL:  The patient was brought to the cardiac catheterization laboratory in the fasting state. The skin over the right wrist was prepped and draped in the usual sterile fashion. Lidocaine infiltration was used to anesthetize the tissue over the right radial artery. Using the micropuncture technique, under ultrasound guidance, a 6-Stateless Glidesheath was inserted in the right radial artery. A 5-Fr Terumo Tiger diagnostic catheter was then advanced over a standard J-wire  into the left ventricular cavity where it was gently aspirated, flushed, and then withdrawn across the aortic valve with sequential pressures measured. This catheter was then used to engage the ostium of the left coronary artery and cineangiograms were obtained in multiple projections for complete evaluation of the left coronary system. This catheter was then used to engage the ostium of the right coronary artery and cineangiograms were obtained in multiple projections for complete evaluation of the right coronary system. Following completion of coronary angiography, all wires, catheters, and sheaths were removed.  A TR band was placed over the right wrist using the patent hemostasis technique.      HEMODYNAMICS:  Aortic pressure: 111/85/98 mmHg  LVEDP: 30 mmHg  No significant aortic gradient on pullback     CORONARY ANGIOGRAPHY:  The left main coronary artery : Large in caliber normal-appearing vessel that bifurcates to LAD and left circumflex  The left anterior descending coronary artery : Normal-appearing large in caliber none transapical vessel that gives rise to 2 large diagonal branches.  The left circumflex coronary artery : Normal-appearing large in caliber vessel that gives rise to a large OM  The right coronary artery  : Large in caliber dominant vessel with RCA that reaches the apex.     IMPRESSION:  1.  Normal-appearing epicardial coronary arteries, dominant RCA, non-transapical LAD  2.  Consider either coronary vasospasm or stress-induced /Takotsubo related NSTEMI presentation  3.  Significantly elevated resting LVEDP 30 mmHg with no significant transaortic gradient on pullback.          NOTIFICATION:  The patient's family were notified of the results in the waiting area.     Referring Cardiologist - Dr. Orozco- was notified.     Micah Barber MD, MPH [facility]  Interventional Cardiologist  [facility]   of Clinical Internal Medicine - [facility]  [facility] [place] [facility]               LABORATORY  Lab Results   Component Value Date    SODIUM 141 03/07/2024    POTASSIUM 4.2 03/07/2024    CHLORIDE 107 03/07/2024    CO2 23 03/07/2024    GLUCOSE 86 03/07/2024    BUN 12 03/07/2024    CREATININE 0.52 03/07/2024        Lab Results   Component Value Date    WBC 5.3 03/07/2024    HEMOGLOBIN 12.4 03/07/2024    HEMATOCRIT 37.5 03/07/2024    PLATELETCT 209 03/07/2024        Total time of the discharge process exceeds 37  minutes.

## 2024-03-08 NOTE — PROGRESS NOTES
Cardiology Follow Up Progress Note    Date of Service  3/7/2024    Attending Physician  No att. providers found    Chief Complaint   Chest pain    HPI  Deja Elias is a 63 y.o. female with no significant medical history other than obesity admitted as a transfer from [facility] 3/5/2024 with NSTEMI.     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 No other cardiac events overnight. Sinus rhythm on telemetry. Vital signs stable. Spo2 95% on room air. She states she is feeling well today and is without any further chest pain. She has been able to ambulate without difficulty.She is very anxious in regard to the news about her heart. No chest pain or palpitations. No shortness of breath, dyspnea on exertion, orthopnea or PND. No lower extremity edema. No dizziness or lightheadedness. No syncope or presyncope.      Review of Systems  Review of Systems   Constitutional: Negative.  Negative for chills, diaphoresis, fatigue and fever.   HENT: Negative.     Eyes: Negative.    Respiratory: Negative.  Negative for cough, chest tightness and shortness of breath.    Cardiovascular: Negative.  Negative for chest pain, palpitations and leg swelling.   Gastrointestinal: Negative.  Negative for constipation, diarrhea, nausea and vomiting.   Endocrine: Negative.    Genitourinary: Negative.  Negative for decreased urine volume, difficulty urinating, dysuria and frequency.   Musculoskeletal: Negative.    Skin: Negative.    Allergic/Immunologic: Negative.    Neurological: Negative.  Negative for dizziness and light-headedness.   Hematological: Negative.  Does not bruise/bleed easily.   Psychiatric/Behavioral: Negative.         Vital signs in last 24 hours  Temp:  [36.2 °C (97.2 °F)-36.7 °C (98.1 °F)] 36.2 °C (97.2 °F)  Pulse:  [69-79] 69  Resp:  [18] 18  BP: ()/(59-78) 127/78  SpO2:  [91 %-95 %] 95 %    Physical  Exam  Physical Exam  Vitals and nursing note reviewed.   Constitutional:       General: She is not in acute distress.     Appearance: Normal appearance. She is not toxic-appearing.   HENT:      Head: Normocephalic and atraumatic.      Right Ear: External ear normal.      Left Ear: External ear normal.      Nose: Nose normal.      Mouth/Throat:      Mouth: Mucous membranes are moist.      Pharynx: Oropharynx is clear.   Eyes:      General: No scleral icterus.     Extraocular Movements: Extraocular movements intact.      Conjunctiva/sclera: Conjunctivae normal.      Pupils: Pupils are equal, round, and reactive to light.   Neck:      Vascular: No JVD.   Cardiovascular:      Rate and Rhythm: Normal rate and regular rhythm.      Pulses: Normal pulses.      Heart sounds: Normal heart sounds. No murmur heard.     No friction rub. No gallop.      Comments: Right wrist cath access site: Clean dry and intact dressing in place. No signs of hematoma, oozing or drainage.   Pulmonary:      Effort: Pulmonary effort is normal.      Breath sounds: Normal breath sounds.   Abdominal:      General: Abdomen is flat. Bowel sounds are normal. There is no distension.      Palpations: Abdomen is soft.   Musculoskeletal:         General: Normal range of motion.      Cervical back: Normal range of motion and neck supple.      Right lower leg: No edema.      Left lower leg: No edema.   Skin:     General: Skin is warm and dry.      Capillary Refill: Capillary refill takes less than 2 seconds.      Coloration: Skin is not jaundiced.   Neurological:      General: No focal deficit present.      Mental Status: She is alert and oriented to person, place, and time.   Psychiatric:         Mood and Affect: Mood normal.         Behavior: Behavior normal.         Lab Review  Lab Results   Component Value Date/Time    WBC 5.3 03/07/2024 02:35 AM    RBC 4.03 (L) 03/07/2024 02:35 AM    HEMOGLOBIN 12.4 03/07/2024 02:35 AM    HEMATOCRIT 37.5 03/07/2024 02:35  AM    MCV 93.1 03/07/2024 02:35 AM    MCH 30.8 03/07/2024 02:35 AM    MCHC 33.1 03/07/2024 02:35 AM    MPV 10.0 03/07/2024 02:35 AM      Lab Results   Component Value Date/Time    SODIUM 141 03/07/2024 02:35 AM    POTASSIUM 4.2 03/07/2024 02:35 AM    CHLORIDE 107 03/07/2024 02:35 AM    CO2 23 03/07/2024 02:35 AM    GLUCOSE 86 03/07/2024 02:35 AM    BUN 12 03/07/2024 02:35 AM    CREATININE 0.52 03/07/2024 02:35 AM      Lab Results   Component Value Date/Time    ASTSGOT 74 (H) 03/06/2024 10:41 AM    ALTSGPT 47 03/06/2024 10:41 AM     Lab Results   Component Value Date/Time    CHOLSTRLTOT 190 03/06/2024 10:41 AM    LDL 98 03/06/2024 10:41 AM    HDL 82 03/06/2024 10:41 AM    TRIGLYCERIDE 51 03/06/2024 10:41 AM    TROPONINT 367 (H) 03/05/2024 07:55 PM       Recent Labs     03/05/24  1747   NTPROBNP 236*       Cardiac Imaging and Procedures Review  Echocardiogram:  3/6/2024  CONCLUSIONS  Poor endocardial definition due to body habitus even with use of echo   contrast.  Assessment of left ventricular systolic function and wall motion is   limited.  The left ventricular ejection fraction is visually estimated to be 40%,   however, the accuracy of this estimate is limited.  There appears to be hypokinesis of the mid left ventricle with   relatively preserved wall motion at the base and apex.  Grade I diastolic function.  The right ventricle was not well-visualized, but appears grossly normal   in size and systolic function.  Estimated right ventricular systolic pressure is 40 mmHg.  Normal inferior vena cava size and inspiratory collapse.  Wall motion abnormalities were difficult to characterize due to   technical limitations, however, these appear to be in a nonvascular   distribution.  Consider variant stress-mediated (takotsubo)   cardiomyopathy or myocarditis.    Cardiac Catheterization:  3/6/2024  HEMODYNAMICS:  Aortic pressure: 111/85/98 mmHg  LVEDP: 30 mmHg  No significant aortic gradient on pullback  CORONARY  ANGIOGRAPHY:  The left main coronary artery : Large in caliber normal-appearing vessel that bifurcates to LAD and left circumflex  The left anterior descending coronary artery : Normal-appearing large in caliber none transapical vessel that gives rise to 2 large diagonal branches.  The left circumflex coronary artery : Normal-appearing large in caliber vessel that gives rise to a large OM  The right coronary artery  : Large in caliber dominant vessel with RCA that reaches the apex.  IMPRESSION:  1.  Normal-appearing epicardial coronary arteries, dominant RCA, non-transapical LAD  2.  Consider either coronary vasospasm or stress-induced /Takotsubo related NSTEMI presentation  3.  Significantly elevated resting LVEDP 30 mmHg with no significant transaortic gradient on pullback.     Imaging  Chest X-Ray:  3/5/2024   FINDINGS:  LUNGS: The lungs are clear.     HEART and MEDIASTINUM: enlarged.     Pleura: There are no pleural effusion or pneumothoraces.     Osseous structures: There is thoracolumbar scoliosis. There is an old right proximal humeral fracture or postoperative change.     There are right upper quadrant surgical clip that are likely from prior cholecystectomy.        IMPRESSION:     No acute cardiopulmonary abnormality identified.      Assessment/Plan  #NSTEMI  #Stress-induced cardiomyopathy  #Alcohol abuse  #Severe obesity    Recommendations:  -She reports she is feeling well today and was without further chest pain.  -S/p coronary angiogram 3/6/2024 which did not reveal any significant coronary artery disease.  After review with Dr. Orozco it does appear there may be a myocardial bridge over the proximal to mid LAD.  -There may also be microvascular disease.  -Given she presented with ACS we will proceed with 12 months of DAPT with aspirin and Plavix 75 mg daily.  -Continue atorvastatin 40 mg daily and enalapril 2.5 mg twice daily.  -Meds-to-beds on discharge  -Cardiac rehab referral placed  -Education  provided  -Radial site care instructions provided  -Discussed returning to ER if chest pain returns and/or call cardiology office at [phone] with any additional new or worsening symptoms  -Discussed CV risk factor modification including cholesterol management, blood pressure management, alcohol and smoking cessation, diet and lifestyle changes.   -Repeat an echocardiogram in 1 month as an outpatient.  -If she has further chest pain as an outpatient, calcium channel blockers can be considered.  -Close follow-up has been arranged.    Cardiology will sign off.    Thank you for allowing me to participate in the care of Deja Elias .    Sarahi Quiroga PA-C, Cardiology  [facility], [address]  [address] [address]  Phone: [phone]  Fax: [phone]    PLEASE NOTE: This note was created using voice recognition software. I have made every reasonable attempt to correct obvious errors, but I expect that there are errors of grammar and possibly content that I did not discover before finalizing the note.     I personally spent a total of 20 minutes which includes face-to-face time and non-face-to-face time spent on preparing to see the patient, reviewing hospital notes and tests, obtaining history from the patient, performing a medically appropriate exam, counseling and educating the patient, ordering medications/tests/procedures/referrals as clinically indicated, and documenting information in the electronic medical record.

## 2024-03-14 ENCOUNTER — OFFICE VISIT (OUTPATIENT)
Dept: CARDIOLOGY | Facility: MEDICAL CENTER | Age: 64
End: 2024-03-14
Attending: NURSE PRACTITIONER
Payer: MEDICARE

## 2024-03-14 VITALS
WEIGHT: 205 LBS | OXYGEN SATURATION: 99 % | HEIGHT: 63 IN | SYSTOLIC BLOOD PRESSURE: 102 MMHG | RESPIRATION RATE: 14 BRPM | BODY MASS INDEX: 36.32 KG/M2 | HEART RATE: 78 BPM | DIASTOLIC BLOOD PRESSURE: 60 MMHG

## 2024-03-14 DIAGNOSIS — R06.02 SOB (SHORTNESS OF BREATH): ICD-10-CM

## 2024-03-14 DIAGNOSIS — F10.10 ALCOHOL ABUSE: ICD-10-CM

## 2024-03-14 DIAGNOSIS — I21.4 NSTEMI (NON-ST ELEVATED MYOCARDIAL INFARCTION) (HCC): ICD-10-CM

## 2024-03-14 DIAGNOSIS — I50.9 HEART FAILURE, NYHA CLASS 2 (HCC): ICD-10-CM

## 2024-03-14 DIAGNOSIS — I50.20 ACC/AHA STAGE B SYSTOLIC HEART FAILURE (HCC): ICD-10-CM

## 2024-03-14 DIAGNOSIS — Z79.899 HIGH RISK MEDICATION USE: ICD-10-CM

## 2024-03-14 PROCEDURE — 3074F SYST BP LT 130 MM HG: CPT | Performed by: NURSE PRACTITIONER

## 2024-03-14 PROCEDURE — 99214 OFFICE O/P EST MOD 30 MIN: CPT | Mod: 25 | Performed by: NURSE PRACTITIONER

## 2024-03-14 PROCEDURE — 94618 PULMONARY STRESS TESTING: CPT | Performed by: NURSE PRACTITIONER

## 2024-03-14 PROCEDURE — 94618 PULMONARY STRESS TESTING: CPT | Mod: 26 | Performed by: NURSE PRACTITIONER

## 2024-03-14 PROCEDURE — 3078F DIAST BP <80 MM HG: CPT | Performed by: NURSE PRACTITIONER

## 2024-03-14 RX ORDER — CYANOCOBALAMIN 1000 UG/ML
1000 INJECTION, SOLUTION INTRAMUSCULAR; SUBCUTANEOUS ONCE
COMMUNITY

## 2024-03-14 ASSESSMENT — MINNESOTA LIVING WITH HEART FAILURE QUESTIONNAIRE (MLHF)
TIRED, FATIGUED OR LOW ON ENERGY: 1
MAKING YOU WORRY: 1
DIFFICULTY SLEEPING WELL AT NIGHT: 0
DIFFICULTY TO CONCENTRATE OR REMEMBERING THINGS: 0
EATING LESS FOODS YOU LIKE: 0
WALKING ABOUT OR CLIMBING STAIRS DIFFICULT: 0
HAVING TO SIT OR LIE DOWN DURING THE DAY: 0
MAKING YOU SHORT OF BREATH: 0
DIFFICULTY WITH RECREATIONAL PASTIMES, SPORTS, HOBBIES: 0
LOSS OF SELF CONTROL IN YOUR LIFE: 0
SWELLING IN ANKLES OR LEGS: 1
DIFFICULTY WORKING TO EARN A LIVING: 0
MAKING YOU FEEL DEPRESSED: 1
DIFFICULTY GOING AWAY FROM HOME: 0
FEELING LIKE A BURDEN TO FAMILY AND FRIENDS: 1
MAKING YOU STAY IN A HOSPITAL: 1
DIFFICULTY SOCIALIZING WITH FAMILY OR FRIENDS: 0
COSTING YOU MONEY FOR MEDICAL CARE: 1
GIVING YOU SIDE EFFECTS FROM TREATMENTS: 0
TOTAL_SCORE: 7
DIFFICULTY WITH SEXUAL ACTIVITIES: 0
WORKING AROUND THE HOUSE OR YARD DIFFICULT: 0

## 2024-03-14 ASSESSMENT — ENCOUNTER SYMPTOMS
ORTHOPNEA: 0
FEVER: 0
DIZZINESS: 0
MYALGIAS: 0
COUGH: 0
SHORTNESS OF BREATH: 0
NERVOUS/ANXIOUS: 1
PALPITATIONS: 0
ABDOMINAL PAIN: 0
PND: 0
CLAUDICATION: 0

## 2024-03-14 ASSESSMENT — 6 MINUTE WALK TEST (6MWT): TOTAL DISTANCE WALKED (METERS): 359.58

## 2024-03-14 ASSESSMENT — FIBROSIS 4 INDEX: FIB4 SCORE: 3.15

## 2024-03-15 NOTE — PROGRESS NOTES
Chief Complaint   Patient presents with    New Patient     - NSTEMI (non-ST elevated myocardial infarction) ([facility])         Subjective     Deja Elias is a 63 y.o. female who presents today for follow-up after her recent hospitalization.    New patient of the office.  She had a recent hospitalization from 3/5/2024 through 3/7/2024 for chest pain.  She was a transfer from [facility] after she developed chest pain from shoveling snow.  Patient was monitored, her troponins were elevated and she was recommended for cardiac cath, which showed no obstructive CAD, but myocardial bridge.  She was recommended to be placed on dual antiplatelet therapy for 12 months for ACS presentation, concern of microvascular disease versus vasospasm, stress-induced cardiomyopathy.    Patient is anxious about her diagnosis.  She denies chest pain, palpitations, orthopnea, PND, edema, shortness of breath or dizziness/lightheadedness.    She mentions her home weights are ranging around 202 to 204 pounds.    She also reports she has cut out some of her higher sodium foods, but is trying to learn how to manage a low-sodium diet.    She reports compliance with her medications.    She is currently not drinking alcohol.  She denies any recreational drug use or tobacco use.    She lives in [place], [place].    Additionally, patient has the following medical problems:     -Hypertension    -Anxiety    -alcohol abuse/use    -Gastric bypass    Past Medical History:   Diagnosis Date    Anxiety     Class 2 severe obesity with serious comorbidity and body mass index (BMI) of 36.0 to 36.9 in adult ([facility]) 06/04/2018    Hypoglycemia     NSTEMI (non-ST elevated myocardial infarction) ([facility]) 03/05/2024    Substance abuse ([facility])      Past Surgical History:   Procedure Laterality Date    BREAST RECONSTRUCTION Bilateral 8/28/2012    Procedure: WITH REPOSITIONING OF IMFRAMMARY FOLDS;  Surgeon: Hilario Wilhelm M.D.;  Location: SURGERY [facility];   Service:     ABDOMINOPLASTY N/A 2012    Procedure: REVERSE ;  Surgeon: Hilario Wilhelm M.D.;  Location: SURGERY [facility];  Service:     BREAST IMPLANT REVISION Bilateral 2012    Procedure: EXCHANGE TO LARGER SALINE IMPLANTS;  Surgeon: Hilario Wilhelm M.D.;  Location: SURGERY [facility];  Service:     CAPSULECTOMY Bilateral 2012    Procedure: CAPSULECTOMY;  Surgeon: Hilario Wilhelm M.D.;  Location: SURGERY [facility];  Service:     [place]  DELIVERY ONLY      CHOLECYSTECTOMY      OTHER      cosmetic arms, legs, breast, stomach    OTHER      ablation (uterine)     Family History   Problem Relation Age of Onset    Heart Disease Mother     Allergies Mother     Arthritis Mother     Heart Disease Father     Allergies Father     Arthritis Father     Asthma Father     Diabetes Father     Diabetes Other     Stroke Other      Social History     Socioeconomic History    Marital status:      Spouse name: Not on file    Number of children: Not on file    Years of education: Not on file    Highest education level: Not on file   Occupational History    Not on file   Tobacco Use    Smoking status: Never    Smokeless tobacco: Never   Vaping Use    Vaping Use: Never used   Substance and Sexual Activity    Alcohol use: No    Drug use: No    Sexual activity: Not on file   Other Topics Concern    Not on file   Social History Narrative    Not on file     Social Determinants of Health     Financial Resource Strain: Not on file   Food Insecurity: Not on file   Transportation Needs: Not on file   Physical Activity: Not on file   Stress: Not on file   Social Connections: Not on file   Intimate Partner Violence: Not on file   Housing Stability: Not on file     Allergies   Allergen Reactions    Morphine Anaphylaxis    Bloodless     Codeine Vomiting     Burns stomach, and vomit      Outpatient Encounter Medications as of 3/14/2024   Medication Sig Dispense Refill    cyanocobalamin (VITAMIN  "B-12) 1000 MCG/ML Solution Inject 1,000 mcg into the shoulder, thigh, or buttocks one time. WEEKLY      enalapril (VASOTEC) 2.5 MG Tab Take 1 Tablet by mouth 2 times a day. 60 Tablet 3    aspirin 81 MG EC tablet Take 1 Tablet by mouth every day. 100 Tablet 3    atorvastatin (LIPITOR) 40 MG Tab Take 1 Tablet by mouth every evening. 30 Tablet 3    clopidogrel (PLAVIX) 75 MG Tab Take 1 Tablet by mouth every day. 30 Tablet 3    omeprazole (PRILOSEC) 20 MG delayed-release capsule Take 1 Capsule by mouth every day. 30 Capsule 3    prazosin (MINIPRESS) 5 MG Cap Take 5 mg by mouth every evening.      traZODone (DESYREL) 50 MG Tab Take 50-75 mg by mouth every evening.      Vitamin D, Ergocalciferol, 48784 units Cap Take 50,000 Units by mouth every Saturday.       No facility-administered encounter medications on file as of 3/14/2024.     Review of Systems   Constitutional:  Negative for fever and malaise/fatigue.   Respiratory:  Negative for cough and shortness of breath.    Cardiovascular:  Negative for chest pain, palpitations, orthopnea, claudication, leg swelling and PND.   Gastrointestinal:  Negative for abdominal pain.   Musculoskeletal:  Negative for myalgias.   Neurological:  Negative for dizziness.   Psychiatric/Behavioral:  The patient is nervous/anxious.    All other systems reviewed and are negative.             Objective     /60 (BP Location: Left arm, Patient Position: Sitting, BP Cuff Size: Adult)   Pulse 78   Resp 14   Ht 1.6 m (5' 3\")   Wt 93 kg (205 lb)   SpO2 99%   BMI 36.31 kg/m²     Physical Exam  Vitals reviewed.   Constitutional:       Appearance: She is well-developed. She is obese.   HENT:      Head: Normocephalic and atraumatic.   Eyes:      Pupils: Pupils are equal, round, and reactive to light.   Neck:      Vascular: No JVD.   Cardiovascular:      Rate and Rhythm: Normal rate and regular rhythm.      Heart sounds: Normal heart sounds.   Pulmonary:      Effort: Pulmonary effort is " normal. No respiratory distress.      Breath sounds: Normal breath sounds. No wheezing or rales.   Abdominal:      General: Bowel sounds are normal.      Palpations: Abdomen is soft.   Musculoskeletal:         General: Normal range of motion.      Cervical back: Normal range of motion and neck supple.      Right lower leg: No edema.      Left lower leg: No edema.   Skin:     General: Skin is warm and dry.   Neurological:      General: No focal deficit present.      Mental Status: She is alert and oriented to person, place, and time.   Psychiatric:         Behavior: Behavior normal.       Lab Results   Component Value Date/Time    CHOLSTRLTOT 190 03/06/2024 10:41 AM    LDL 98 03/06/2024 10:41 AM    HDL 82 03/06/2024 10:41 AM    TRIGLYCERIDE 51 03/06/2024 10:41 AM       Lab Results   Component Value Date/Time    SODIUM 141 03/07/2024 02:35 AM    POTASSIUM 4.2 03/07/2024 02:35 AM    CHLORIDE 107 03/07/2024 02:35 AM    CO2 23 03/07/2024 02:35 AM    GLUCOSE 86 03/07/2024 02:35 AM    BUN 12 03/07/2024 02:35 AM    CREATININE 0.52 03/07/2024 02:35 AM     Lab Results   Component Value Date/Time    ALKPHOSPHAT 106 (H) 03/06/2024 10:41 AM    ASTSGOT 74 (H) 03/06/2024 10:41 AM    ALTSGPT 47 03/06/2024 10:41 AM    TBILIRUBIN 1.5 03/06/2024 10:41 AM      Transthoracic Echo Report 3/6/2024  Poor endocardial definition due to body habitus even with use of echo   contrast.  Assessment of left ventricular systolic function and wall motion is   limited.  The left ventricular ejection fraction is visually estimated to be 40%,   however, the accuracy of this estimate is limited.  There appears to be hypokinesis of the mid left ventricle with   relatively preserved wall motion at the base and apex.  Grade I diastolic function.  The right ventricle was not well-visualized, but appears grossly normal   in size and systolic function.  Estimated right ventricular systolic pressure is 40 mmHg.  Normal inferior vena cava size and inspiratory  collapse.     Wall motion abnormalities were difficult to characterize due to   technical limitations, however, these appear to be in a nonvascular   distribution.  Consider variant stress-mediated (takotsubo)   cardiomyopathy or myocarditis.    Heart cath 3/6/2024  HEMODYNAMICS:  Aortic pressure: 111/85/98 mmHg  LVEDP: 30 mmHg  No significant aortic gradient on pullback     CORONARY ANGIOGRAPHY:  The left main coronary artery : Large in caliber normal-appearing vessel that bifurcates to LAD and left circumflex  The left anterior descending coronary artery : Normal-appearing large in caliber none transapical vessel that gives rise to 2 large diagonal branches.  The left circumflex coronary artery : Normal-appearing large in caliber vessel that gives rise to a large OM  The right coronary artery  : Large in caliber dominant vessel with RCA that reaches the apex.     IMPRESSION:  1.  Normal-appearing epicardial coronary arteries, dominant RCA, non-transapical LAD  2.  Consider either coronary vasospasm or stress-induced /Takotsubo related NSTEMI presentation  3.  Significantly elevated resting LVEDP 30 mmHg with no significant transaortic gradient on pullback.          NOTIFICATION:  The patient's family were notified of the results in the waiting area.    Date 6MWT MLWHF   3/14/2024 359m in 6 minutes 7                       Assessment & Plan     1. Heart failure, NYHA class 2 (HCC)  REFERRAL TO CARDIOLOGY - HEART FAILURE NURSING EDUCATION    Basic Metabolic Panel (BMP)    proBrain Natriuretic Peptide, NT    EC-ECHOCARDIOGRAM COMPLETE W/O CONT      2. High risk medication use  REFERRAL TO CARDIOLOGY - HEART FAILURE NURSING EDUCATION    Basic Metabolic Panel (BMP)    proBrain Natriuretic Peptide, NT    EC-ECHOCARDIOGRAM COMPLETE W/O CONT      3. ACC/AHA stage B systolic heart failure (HCC)        4. NSTEMI (non-ST elevated myocardial infarction) (HCC)        5. Alcohol abuse        6. SOB (shortness of breath) [R06.02]             Medical Decision Making: Today's Assessment/Status/Plan:        Possible stress-induced cardiomyopathy, LVEF 40%, HFrEF, stage B, NYHA class I: Based on physical examination findings, patient is euvolemic. No JVD, lungs are clear to auscultation, no pitting edema in bilateral lower extremities, no ascites.  -Continue enalapril 2.5 mg twice a day  -Repeat echo in 1 month  -BMP, NT proBNP due before next visit  -Recommend heart failure education  -Reinforced s/sx of worsening heart failure with patient and weight monitoring. Pt verbalizes understanding. Pt to call office or RTC if present.    -Did spend a few minutes reviewing low-sodium diet and reading labels with patient  -Continue alcohol cessation    NSTEMI:  -No obstructive disease seen on angiogram  -Patient to continue aspirin 81 mg daily and clopidogrel 75 mg daily for 1 year due to ACS presentation  -Continue atorvastatin 40 mg daily  -Patient not able to participate in cardiac rehab as she lives out of town    FU in clinic in 2 months with echo. Sooner if needed.    Patient verbalizes understanding and agrees with the plan of care.     PLEASE NOTE: This Note was created using voice recognition Software. I have made every reasonable attempt to correct obvious errors, but I expect that there are errors of grammar and possibly content that I did not discover before finalizing the note

## 2024-04-09 ENCOUNTER — TELEPHONE (OUTPATIENT)
Dept: CARDIOLOGY | Facility: MEDICAL CENTER | Age: 64
End: 2024-04-09
Payer: MEDICARE

## 2024-04-09 NOTE — TELEPHONE ENCOUNTER
Phone number called: [phone]     Call outcome: Voicemail reached. Detailed message left with ER precautions and with number provided to return call.

## 2024-04-09 NOTE — TELEPHONE ENCOUNTER
Phone number called: [phone]    Call outcome: Spoke to patient and she stated she woke up earlier today and said she felt slightly fatigued and a little dizzy but laid back down and felt well when she got up awhile later. She then went to [employer] and after walking awhile started to feel short of breath. When she got home her shortness of breath had resolved and she checked her vitals which were 116/69, HR 85 and O2 96%. Patient states she has gained 1 lb since being seen in office and that she has been trying to monitor her weight and sodium intake closely. She states she did have french fries yesterday. No swelling or other symptoms noted. She states she feels well now but was concerned about the symptoms. Advised patient to continue monitoring sodium and avoiding high salt foods that can lead to fluid retention. She states nothing else has changed, no missed medications and adequate urine output. ER precautions given for returning symptoms, especially if worsening. Patient has no other complaints at this time. Advised her to reach out if symptoms return or any new concerns come up. All questions/concerns answered at this time, patient appreciative of information given.

## 2024-04-09 NOTE — TELEPHONE ENCOUNTER
Caller:  Deja    Topic/issue: Deja is returning your call    Callback Number: [phone]    Thank you,   Veronica PERSON

## 2024-04-09 NOTE — TELEPHONE ENCOUNTER
ADELIA    Caller: Deja Elias     Topic/issue: Patient woke up light headed and dizzy and she can not catch her breath she is very concerned she says she can't breathe , please call.. Advised ER     Callback Number: [phone]    Thank You   Cecily SHARP

## 2024-05-21 ENCOUNTER — OFFICE VISIT (OUTPATIENT)
Dept: CARDIOLOGY | Facility: MEDICAL CENTER | Age: 64
End: 2024-05-21
Attending: NURSE PRACTITIONER
Payer: MEDICARE

## 2024-05-21 VITALS
WEIGHT: 204.4 LBS | HEIGHT: 63 IN | BODY MASS INDEX: 36.21 KG/M2 | SYSTOLIC BLOOD PRESSURE: 124 MMHG | RESPIRATION RATE: 16 BRPM | DIASTOLIC BLOOD PRESSURE: 84 MMHG | OXYGEN SATURATION: 98 % | HEART RATE: 58 BPM

## 2024-05-21 DIAGNOSIS — Z79.899 HIGH RISK MEDICATION USE: ICD-10-CM

## 2024-05-21 DIAGNOSIS — I50.9 HEART FAILURE, NYHA CLASS 1 (HCC): ICD-10-CM

## 2024-05-21 DIAGNOSIS — Z71.89 ENCOUNTER FOR EDUCATION ABOUT HEART FAILURE: ICD-10-CM

## 2024-05-21 DIAGNOSIS — I50.20 ACC/AHA STAGE B SYSTOLIC HEART FAILURE (HCC): ICD-10-CM

## 2024-05-21 DIAGNOSIS — F10.10 ALCOHOL ABUSE: ICD-10-CM

## 2024-05-21 DIAGNOSIS — I50.9 HEART FAILURE, NYHA CLASS 2 (HCC): ICD-10-CM

## 2024-05-21 DIAGNOSIS — I21.4 NSTEMI (NON-ST ELEVATED MYOCARDIAL INFARCTION) (HCC): ICD-10-CM

## 2024-05-21 LAB
LV EJECT FRACT MOD 2C 99903: 63.57
LV EJECT FRACT MOD 4C 99902: 58.4
LV EJECT FRACT MOD BP 99901: 59.15

## 2024-05-21 PROCEDURE — 93306 TTE W/DOPPLER COMPLETE: CPT | Mod: 26 | Performed by: INTERNAL MEDICINE

## 2024-05-21 PROCEDURE — 3074F SYST BP LT 130 MM HG: CPT | Performed by: NURSE PRACTITIONER

## 2024-05-21 PROCEDURE — 99214 OFFICE O/P EST MOD 30 MIN: CPT | Performed by: NURSE PRACTITIONER

## 2024-05-21 PROCEDURE — 99999 PR NO CHARGE: CPT | Performed by: NURSE PRACTITIONER

## 2024-05-21 PROCEDURE — 3079F DIAST BP 80-89 MM HG: CPT | Performed by: NURSE PRACTITIONER

## 2024-05-21 ASSESSMENT — ENCOUNTER SYMPTOMS
PALPITATIONS: 0
ABDOMINAL PAIN: 0
FEVER: 0
MYALGIAS: 0
DIZZINESS: 0
ORTHOPNEA: 0
CLAUDICATION: 0
NERVOUS/ANXIOUS: 1
PND: 0
COUGH: 0
SHORTNESS OF BREATH: 0

## 2024-05-21 ASSESSMENT — FIBROSIS 4 INDEX: FIB4 SCORE: 3.15

## 2024-05-21 NOTE — PROGRESS NOTES
Pt and friend arrived for HF education.     Discussed in great detail dysfunction of heart muscle based on description of systolic versus diastolic versus right-sided.     Provided possible etiology based on patient chart review.    Medication purpose and function in body discussed with great detail. Described acclamation period with new medications and titrations.     Reinforced proper home weight and blood pressure monitoring to include BP 2 hours after medication. Tips provided for symptomatic low blood pressure (8 oz of water, small salty snack, laying down with feet elevated). Provided warning signs of orthostatic hypotension.     Educated on appropriate water versus total fluid intake/restriction as well as how to properly read nutrition levels for salt intake monitoring. Provided visual and verbal understanding of salt versus water intake in our bodies and how it affects HF. Additionally provided detail information of how salt is in all foods naturally at some capacity    Discussed physical activity recommendations including starting slow with gradual increases as improvement occurs. Avoid excessive cardio with high increased HR and blood pressure for extended periods of time. Patient should be able to recover and back to baseline within 30 min.    All questions answered, total time spent with patient 105 min.

## 2024-05-21 NOTE — PROGRESS NOTES
Chief Complaint   Patient presents with    Congestive Heart Failure     F/V DX:Heart failure, NYHA class 2 ([facility])         Subjective     Deja Elias is a 63 y.o. female who presents today for follow-up on her heart failure with her friend, Racheal.    Current patient of the heart failure clinic.  She was last seen in clinic on 3/14/2024.  During her last visit, she was sent for repeat echocardiogram and lab testing.    Since her last visit, she has been doing quite well, she does continue to report some episodes of chest pressure that are brief, but otherwise denies chest pain, palpitations, orthopnea, PND, edema, shortness of breath or dizziness/lightheadedness.    She mentions her home weights are stable    She does walk about 1 time per week.    She reports she is doing better about her sodium intake.    She reports compliance with her medications.    She is currently not drinking alcohol.  She denies any recreational drug use or tobacco use.    She lives in [place], [place].    Additionally, patient has the following medical problems:     -hospitalization from 3/5/2024 through 3/7/2024 for chest pain.  She was a transfer from [facility] after she developed chest pain from shoveling snow.  Patient was monitored, her troponins were elevated and she was recommended for cardiac cath, which showed no obstructive CAD, but myocardial bridge.  She was recommended to be placed on dual antiplatelet therapy for 12 months for ACS presentation, concern of microvascular disease versus vasospasm, stress-induced cardiomyopathy.    -Hypertension    -Anxiety    -alcohol abuse/use    -Gastric bypass    Past Medical History:   Diagnosis Date    Anxiety     Class 2 severe obesity with serious comorbidity and body mass index (BMI) of 36.0 to 36.9 in adult ([facility]) 06/04/2018    Hypoglycemia     NSTEMI (non-ST elevated myocardial infarction) ([facility]) 03/05/2024    Substance abuse ([facility])      Past Surgical History:   Procedure  Laterality Date    BREAST RECONSTRUCTION Bilateral 2012    Procedure: WITH REPOSITIONING OF IMFRAMMARY FOLDS;  Surgeon: Hilario Wilhelm M.D.;  Location: SURGERY [facility];  Service:     ABDOMINOPLASTY N/A 2012    Procedure: REVERSE ;  Surgeon: Hilario Wilhelm M.D.;  Location: [facility];  Service:     BREAST IMPLANT REVISION Bilateral 2012    Procedure: EXCHANGE TO LARGER SALINE IMPLANTS;  Surgeon: Hilario Wilhelm M.D.;  Location: SURGERY [facility];  Service:     CAPSULECTOMY Bilateral 2012    Procedure: CAPSULECTOMY;  Surgeon: Hilario Wilhelm M.D.;  Location: SURGERY [facility];  Service:     SD  DELIVERY ONLY      CHOLECYSTECTOMY      OTHER      cosmetic arms, legs, breast, stomach    OTHER      ablation (uterine)     Family History   Problem Relation Age of Onset    Heart Disease Mother     Allergies Mother     Arthritis Mother     Heart Disease Father     Allergies Father     Arthritis Father     Asthma Father     Diabetes Father     Diabetes Other     Stroke Other      Social History     Socioeconomic History    Marital status:      Spouse name: Not on file    Number of children: Not on file    Years of education: Not on file    Highest education level: Not on file   Occupational History    Not on file   Tobacco Use    Smoking status: Never    Smokeless tobacco: Never   Vaping Use    Vaping status: Never Used   Substance and Sexual Activity    Alcohol use: No    Drug use: No    Sexual activity: Not on file   Other Topics Concern    Not on file   Social History Narrative    Not on file     Social Determinants of Health     Financial Resource Strain: Not on file   Food Insecurity: Not on file   Transportation Needs: Not on file   Physical Activity: Not on file   Stress: Not on file   Social Connections: Not on file   Intimate Partner Violence: Not on file   Housing Stability: Not on file     Allergies   Allergen Reactions     "Morphine Anaphylaxis    Bloodless     Codeine Vomiting     Burns stomach, and vomit      Outpatient Encounter Medications as of 5/21/2024   Medication Sig Dispense Refill    cyanocobalamin (VITAMIN B-12) 1000 MCG/ML Solution Inject 1,000 mcg into the shoulder, thigh, or buttocks one time. WEEKLY      enalapril (VASOTEC) 2.5 MG Tab Take 1 Tablet by mouth 2 times a day. 60 Tablet 3    aspirin 81 MG EC tablet Take 1 Tablet by mouth every day. 100 Tablet 3    atorvastatin (LIPITOR) 40 MG Tab Take 1 Tablet by mouth every evening. 30 Tablet 3    clopidogrel (PLAVIX) 75 MG Tab Take 1 Tablet by mouth every day. 30 Tablet 3    omeprazole (PRILOSEC) 20 MG delayed-release capsule Take 1 Capsule by mouth every day. 30 Capsule 3    traZODone (DESYREL) 50 MG Tab Take 50-75 mg by mouth every evening.      Vitamin D, Ergocalciferol, 15636 units Cap Take 50,000 Units by mouth every Saturday.      prazosin (MINIPRESS) 5 MG Cap Take 5 mg by mouth every evening. (Patient not taking: Reported on 5/21/2024)       No facility-administered encounter medications on file as of 5/21/2024.     Review of Systems   Constitutional:  Negative for fever and malaise/fatigue.   Respiratory:  Negative for cough and shortness of breath.    Cardiovascular:  Negative for chest pain (Brief chest pressure), palpitations, orthopnea, claudication, leg swelling and PND.   Gastrointestinal:  Negative for abdominal pain.   Musculoskeletal:  Negative for myalgias.   Neurological:  Negative for dizziness.   Psychiatric/Behavioral:  The patient is nervous/anxious.    All other systems reviewed and are negative.             Objective     /84 (BP Location: Right arm, Patient Position: Sitting, BP Cuff Size: Adult)   Pulse (!) 58   Resp 16   Ht 1.6 m (5' 3\")   Wt 92.7 kg (204 lb 6.4 oz)   SpO2 98%   BMI 36.21 kg/m²     Physical Exam  Vitals reviewed.   Constitutional:       Appearance: She is well-developed. She is obese.   HENT:      Head: Normocephalic " and atraumatic.   Eyes:      Pupils: Pupils are equal, round, and reactive to light.   Neck:      Vascular: No JVD.   Cardiovascular:      Rate and Rhythm: Normal rate and regular rhythm.      Heart sounds: Normal heart sounds.   Pulmonary:      Effort: Pulmonary effort is normal. No respiratory distress.      Breath sounds: Normal breath sounds. No wheezing or rales.   Abdominal:      General: Bowel sounds are normal.      Palpations: Abdomen is soft.   Musculoskeletal:         General: Normal range of motion.      Cervical back: Normal range of motion and neck supple.      Right lower leg: No edema.      Left lower leg: No edema.   Skin:     General: Skin is warm and dry.   Neurological:      General: No focal deficit present.      Mental Status: She is alert and oriented to person, place, and time.   Psychiatric:         Behavior: Behavior normal.       Lab Results   Component Value Date/Time    CHOLSTRLTOT 190 03/06/2024 10:41 AM    LDL 98 03/06/2024 10:41 AM    HDL 82 03/06/2024 10:41 AM    TRIGLYCERIDE 51 03/06/2024 10:41 AM       Lab Results   Component Value Date/Time    SODIUM 141 03/07/2024 02:35 AM    POTASSIUM 4.2 03/07/2024 02:35 AM    CHLORIDE 107 03/07/2024 02:35 AM    CO2 23 03/07/2024 02:35 AM    GLUCOSE 86 03/07/2024 02:35 AM    BUN 12 03/07/2024 02:35 AM    CREATININE 0.52 03/07/2024 02:35 AM     Lab Results   Component Value Date/Time    ALKPHOSPHAT 106 (H) 03/06/2024 10:41 AM    ASTSGOT 74 (H) 03/06/2024 10:41 AM    ALTSGPT 47 03/06/2024 10:41 AM    TBILIRUBIN 1.5 03/06/2024 10:41 AM      Transthoracic Echo Report 3/6/2024  Poor endocardial definition due to body habitus even with use of echo   contrast.  Assessment of left ventricular systolic function and wall motion is   limited.  The left ventricular ejection fraction is visually estimated to be 40%,   however, the accuracy of this estimate is limited.  There appears to be hypokinesis of the mid left ventricle with   relatively preserved  wall motion at the base and apex.  Grade I diastolic function.  The right ventricle was not well-visualized, but appears grossly normal   in size and systolic function.  Estimated right ventricular systolic pressure is 40 mmHg.  Normal inferior vena cava size and inspiratory collapse.     Wall motion abnormalities were difficult to characterize due to   technical limitations, however, these appear to be in a nonvascular   distribution.  Consider variant stress-mediated (takotsubo)   cardiomyopathy or myocarditis.    Heart cath 3/6/2024  HEMODYNAMICS:  Aortic pressure: 111/85/98 mmHg  LVEDP: 30 mmHg  No significant aortic gradient on pullback     CORONARY ANGIOGRAPHY:  The left main coronary artery : Large in caliber normal-appearing vessel that bifurcates to LAD and left circumflex  The left anterior descending coronary artery : Normal-appearing large in caliber none transapical vessel that gives rise to 2 large diagonal branches.  The left circumflex coronary artery : Normal-appearing large in caliber vessel that gives rise to a large OM  The right coronary artery  : Large in caliber dominant vessel with RCA that reaches the apex.     IMPRESSION:  1.  Normal-appearing epicardial coronary arteries, dominant RCA, non-transapical LAD  2.  Consider either coronary vasospasm or stress-induced /Takotsubo related NSTEMI presentation  3.  Significantly elevated resting LVEDP 30 mmHg with no significant transaortic gradient on pullback.          NOTIFICATION:  The patient's family were notified of the results in the waiting area.    Transthoracic Echo Report 5/21/2024  Compared to the prior study on 03/06/24. Normalization of the LV   systolic function, otherwise unchanged.  Normotensive during exam  Sinus rhythm/sinus bradycardia during exam  Normal left ventricular size, thickness, systolic function, and   diastolic function. Normal regional wall motion. The ejection fraction   is measured to be 60-65%. Normal diastolic  function.   No significant valvular disease      Date 6MWT MLWHF   3/14/2024 359m in 6 minutes 7                       Assessment & Plan     1. ACC/AHA stage B systolic heart failure (HCC)        2. Heart failure, NYHA class 1 (HCC)        3. NSTEMI (non-ST elevated myocardial infarction) (HCC)        4. High risk medication use        5. Alcohol abuse              Medical Decision Making: Today's Assessment/Status/Plan:        Possible stress-induced cardiomyopathy, LVEF 60 to 65% improved from 40%, HFrEF, stage B, NYHA class I: Based on physical examination findings, patient is euvolemic. No JVD, lungs are clear to auscultation, no pitting edema in bilateral lower extremities, no ascites.  -Continue enalapril 2.5 mg twice a day  -Patient did not get her labs done, but states she will get them done tomorrow  -She had heart failure education today  -Reinforced s/sx of worsening heart failure with patient and weight monitoring. Pt verbalizes understanding. Pt to call office or RTC if present.    -Continue alcohol cessation    NSTEMI:  -No obstructive disease seen on angiogram  -Patient to continue aspirin 81 mg daily and clopidogrel 75 mg daily for 1 year due to ACS presentation  -Continue atorvastatin 40 mg daily  -Patient not able to participate in cardiac rehab as she lives out of town  -Recommend patient to increase her walking and exercise.    FU in clinic in 6 months. Sooner if needed.    Patient verbalizes understanding and agrees with the plan of care.     PLEASE NOTE: This Note was created using voice recognition Software. I have made every reasonable attempt to correct obvious errors, but I expect that there are errors of grammar and possibly content that I did not discover before finalizing the note

## 2024-09-24 ENCOUNTER — TELEPHONE (OUTPATIENT)
Dept: CARDIOLOGY | Facility: MEDICAL CENTER | Age: 64
End: 2024-09-24
Payer: MEDICARE

## 2024-09-24 NOTE — LETTER
PROCEDURE/SURGERY CLEARANCE FORM      Encounter Date: 9/24/2024    Patient: Deja Elias  YOB: 1960    CARDIOLOGIST:  JUAN DAVID Humphrey    REFERRING DOCTOR:  No ref. provider found        The  following procedure/surgery: Caudal Epidural steroid injection                                            Additional comments: Pt can proceed with caudal epidural steroid injection at low risk.Discussed angiogram results with Dr. Barber. Can stop plavix now, continue aspirin 81 mg daily until 7 days prior to her procedure.  And then hold Aspirin 7 days prior to injection and resume after procedure.             Electronically signed     MD Signature   JUAN DAVID Humphrey

## 2024-09-24 NOTE — TELEPHONE ENCOUNTER
Last OV: 05/21/2024  Proposed Surgery: Caudal Epidural steroid injection  Surgery Date: TBD  Requesting Office Name: [facility]  Fax Number: [phone]  Preference of Location (default is surgery center unless specified by Cardiologist or VIRY)  Prior Clearance Addressed: No      Anticoags/Antiplatelets: Clopidogrel  and Aspirin  Anticoags/Antiplatelet managed by Cardiology? YES    Outstanding Cardiac Imaging : Yes  Other    Clearance to provider to review  Stent, Cardiac Devices, or Catheterization: Yes  Within the last 6 months. Forward to provider to review.  Ablation, TAVR/Valve (including open heart), Cardioversion: No  Recent Cardiac Hospitalization: Yes  Date:  03/05/2024            When: Greater than 3 months since hospitalization   History (cardiac history):   Past Medical History:   Diagnosis Date    Anxiety     Class 2 severe obesity with serious comorbidity and body mass index (BMI) of 36.0 to 36.9 in adult ([facility]) 06/04/2018    Hypoglycemia     NSTEMI (non-ST elevated myocardial infarction) ([facility]) 03/05/2024    Substance abuse ([facility])              Surgical Clearance Letter Sent: No Provider to advise.   **Scan clearance request letter into [facility].**

## 2024-09-25 NOTE — TELEPHONE ENCOUNTER
PER ADELIA-Pt can proceed with caudal epidural steroid injection at low risk.Discussed angiogram results with Dr. Barber. can stop plavix now, continue aspirin 81 mg daily until 7 days prior to her procedure. And then hold Aspirin 7 days prior to injection and resume after procedure.     Letter sent.

## 2024-09-25 NOTE — TELEPHONE ENCOUNTER
Pt can proceed with caudal epidural steroid injection at low risk.    Discussed angiogram results with Dr. Barber.    Cathryn, can you tell the patient that she can stop plavix now, continue aspirin 81 mg daily until 7 days prior to her procedure.  And then hold Aspirin 7 days prior to injection and resume after procedure.

## 2024-10-21 ENCOUNTER — TELEPHONE (OUTPATIENT)
Dept: CARDIOLOGY | Facility: MEDICAL CENTER | Age: 64
End: 2024-10-21
Payer: MEDICARE

## 2024-10-21 ENCOUNTER — HOSPITAL ENCOUNTER (OUTPATIENT)
Dept: LAB | Facility: MEDICAL CENTER | Age: 64
End: 2024-10-21
Attending: NURSE PRACTITIONER
Payer: MEDICARE

## 2024-10-21 DIAGNOSIS — I50.9 HEART FAILURE, NYHA CLASS 2 (HCC): ICD-10-CM

## 2024-10-21 DIAGNOSIS — Z79.899 HIGH RISK MEDICATION USE: ICD-10-CM

## 2024-10-21 LAB
ANION GAP SERPL CALC-SCNC: 10 MMOL/L (ref 7–16)
BUN SERPL-MCNC: 17 MG/DL (ref 8–22)
CALCIUM SERPL-MCNC: 9.8 MG/DL (ref 8.5–10.5)
CHLORIDE SERPL-SCNC: 104 MMOL/L (ref 96–112)
CO2 SERPL-SCNC: 25 MMOL/L (ref 20–33)
CREAT SERPL-MCNC: 0.87 MG/DL (ref 0.5–1.4)
GFR SERPLBLD CREATININE-BSD FMLA CKD-EPI: 75 ML/MIN/1.73 M 2
GLUCOSE SERPL-MCNC: 80 MG/DL (ref 65–99)
NT-PROBNP SERPL IA-MCNC: 74 PG/ML (ref 0–125)
POTASSIUM SERPL-SCNC: 4.3 MMOL/L (ref 3.6–5.5)
SODIUM SERPL-SCNC: 139 MMOL/L (ref 135–145)

## 2024-10-21 PROCEDURE — 83880 ASSAY OF NATRIURETIC PEPTIDE: CPT

## 2024-10-21 PROCEDURE — 80048 BASIC METABOLIC PNL TOTAL CA: CPT

## 2024-10-21 PROCEDURE — 36415 COLL VENOUS BLD VENIPUNCTURE: CPT

## 2024-10-30 ENCOUNTER — OFFICE VISIT (OUTPATIENT)
Dept: CARDIOLOGY | Facility: MEDICAL CENTER | Age: 64
End: 2024-10-30
Attending: NURSE PRACTITIONER
Payer: MEDICARE

## 2024-10-30 VITALS
HEIGHT: 63 IN | BODY MASS INDEX: 35.19 KG/M2 | WEIGHT: 198.6 LBS | SYSTOLIC BLOOD PRESSURE: 118 MMHG | RESPIRATION RATE: 18 BRPM | DIASTOLIC BLOOD PRESSURE: 72 MMHG | OXYGEN SATURATION: 97 % | HEART RATE: 67 BPM

## 2024-10-30 DIAGNOSIS — E78.2 MIXED HYPERLIPIDEMIA: ICD-10-CM

## 2024-10-30 DIAGNOSIS — F10.10 ALCOHOL ABUSE: ICD-10-CM

## 2024-10-30 DIAGNOSIS — I50.20 ACC/AHA STAGE B SYSTOLIC HEART FAILURE (HCC): ICD-10-CM

## 2024-10-30 DIAGNOSIS — I21.4 NSTEMI (NON-ST ELEVATED MYOCARDIAL INFARCTION) (HCC): ICD-10-CM

## 2024-10-30 DIAGNOSIS — R00.2 PALPITATIONS: ICD-10-CM

## 2024-10-30 LAB — EKG IMPRESSION: NORMAL

## 2024-10-30 PROCEDURE — 99212 OFFICE O/P EST SF 10 MIN: CPT | Performed by: NURSE PRACTITIONER

## 2024-10-30 PROCEDURE — 93005 ELECTROCARDIOGRAM TRACING: CPT | Performed by: NURSE PRACTITIONER

## 2024-10-30 RX ORDER — CLOPIDOGREL BISULFATE 75 MG/1
75 TABLET ORAL DAILY
Qty: 100 TABLET | Refills: 2 | Status: SHIPPED | OUTPATIENT
Start: 2024-10-30

## 2024-10-30 RX ORDER — ENALAPRIL MALEATE 2.5 MG/1
2.5 TABLET ORAL 2 TIMES DAILY
Qty: 100 TABLET | Refills: 3 | Status: SHIPPED | OUTPATIENT
Start: 2024-10-30

## 2024-10-30 RX ORDER — ATORVASTATIN CALCIUM 40 MG/1
40 TABLET, FILM COATED ORAL EVERY EVENING
Qty: 100 TABLET | Refills: 3 | Status: SHIPPED | OUTPATIENT
Start: 2024-10-30

## 2024-10-30 ASSESSMENT — ENCOUNTER SYMPTOMS
DIZZINESS: 0
SYNCOPE: 0
LIGHT-HEADEDNESS: 0
PALPITATIONS: 0
HEADACHES: 0
SHORTNESS OF BREATH: 0
DYSPNEA ON EXERTION: 0

## 2024-10-30 ASSESSMENT — FIBROSIS 4 INDEX: FIB4 SCORE: 3.15

## 2025-03-18 ENCOUNTER — TELEPHONE (OUTPATIENT)
Dept: CARDIOLOGY | Facility: MEDICAL CENTER | Age: 65
End: 2025-03-18
Payer: MEDICARE

## 2025-03-18 NOTE — TELEPHONE ENCOUNTER
Call placed to patient as a reminder for blood work to be completed prior too appt. Per patient will complete blood work at [facility] in [place]. Will have office fax us the results.

## 2025-03-20 LAB
CHOLEST SERPL-MCNC: 165 MG/DL
HDLC SERPL-MCNC: 76 MG/DL
LDLC SERPL CALC-MCNC: 78 MG/DL
TRIGL SERPL-MCNC: 51 MG/DL

## 2025-03-27 ENCOUNTER — RESULTS FOLLOW-UP (OUTPATIENT)
Dept: CARDIOLOGY | Facility: MEDICAL CENTER | Age: 65
End: 2025-03-27
Payer: MEDICARE

## 2025-03-27 DIAGNOSIS — E78.2 MIXED HYPERLIPIDEMIA: ICD-10-CM

## 2025-04-03 ENCOUNTER — APPOINTMENT (OUTPATIENT)
Dept: CARDIOLOGY | Facility: MEDICAL CENTER | Age: 65
End: 2025-04-03
Attending: NURSE PRACTITIONER
Payer: MEDICARE

## 2025-04-14 ENCOUNTER — OFFICE VISIT (OUTPATIENT)
Dept: CARDIOLOGY | Facility: MEDICAL CENTER | Age: 65
End: 2025-04-14
Attending: NURSE PRACTITIONER
Payer: MEDICARE

## 2025-04-14 VITALS
DIASTOLIC BLOOD PRESSURE: 70 MMHG | HEIGHT: 63 IN | SYSTOLIC BLOOD PRESSURE: 116 MMHG | HEART RATE: 58 BPM | BODY MASS INDEX: 38.02 KG/M2 | WEIGHT: 214.6 LBS | OXYGEN SATURATION: 99 % | RESPIRATION RATE: 18 BRPM

## 2025-04-14 DIAGNOSIS — I21.4 NSTEMI (NON-ST ELEVATED MYOCARDIAL INFARCTION) (HCC): ICD-10-CM

## 2025-04-14 DIAGNOSIS — F10.10 ALCOHOL ABUSE: ICD-10-CM

## 2025-04-14 DIAGNOSIS — E78.2 MIXED HYPERLIPIDEMIA: ICD-10-CM

## 2025-04-14 DIAGNOSIS — I50.9 HEART FAILURE, NYHA CLASS 1 (HCC): ICD-10-CM

## 2025-04-14 DIAGNOSIS — R00.2 PALPITATIONS: ICD-10-CM

## 2025-04-14 DIAGNOSIS — I50.20 ACC/AHA STAGE B SYSTOLIC HEART FAILURE (HCC): ICD-10-CM

## 2025-04-14 PROCEDURE — 99212 OFFICE O/P EST SF 10 MIN: CPT | Performed by: NURSE PRACTITIONER

## 2025-04-14 PROCEDURE — 3074F SYST BP LT 130 MM HG: CPT | Performed by: NURSE PRACTITIONER

## 2025-04-14 PROCEDURE — 3078F DIAST BP <80 MM HG: CPT | Performed by: NURSE PRACTITIONER

## 2025-04-14 PROCEDURE — 99214 OFFICE O/P EST MOD 30 MIN: CPT | Performed by: NURSE PRACTITIONER

## 2025-04-14 ASSESSMENT — FIBROSIS 4 INDEX: FIB4 SCORE: 3.2

## 2025-04-14 NOTE — PROGRESS NOTES
Chief Complaint   Patient presents with    MI (Non ST Segment Elevation MI)    Congestive Heart Failure     HFimpEF       Subjective     Deja Elias is a 64 y.o. female who presents today for follow-up on her heart failure.    Current patient of the heart failure clinic.  She was last seen in clinic on 5/21/2024 by JUAN DAVID Humphrey.  During that visit, she was doing well other than some episodes of chest pressure.  She was euvolemic and was continued on enalapril 2.5 mg twice daily.    She was also seen by Jessica Hinds general cardiology APRN on 10/30/2024.  During that visit she reported palpitations.  She also reported about 4 to 5 months prior, she started drinking 3 diet coke/rums each night.  She was continued on current medication regimen and plan to consider a cardiac monitor or initiating beta-blocker if symptoms become overly bothersome     Today she reports palpitations have resolved and no further episodes of chest pressure.  Reports cutting back on alcohol consumption to 2 rum and coke a week from 2-3/night. No dyspnea, orthopnea, PND, dizziness/lightheadedness, syncope or lower extremity edema, .    Weight at home: 212 lb-214 lb  BP at home: 116/70, 60s bpm    She does walk everyday (2 blocks).     She reports she is doing better about her sodium intake.     She denies any recreational drug use or tobacco use.    She lives in [place], [place].    Additionally, patient has the following medical problems:     -hospitalization from 3/5/2024 through 3/7/2024 for chest pain.  She was a transfer from [facility] after she developed chest pain from shoveling snow.  Patient was monitored, her troponins were elevated and she was recommended for cardiac cath, which showed no obstructive CAD, but myocardial bridge.  She was recommended to be placed on dual antiplatelet therapy for 12 months for ACS presentation, concern of microvascular disease versus vasospasm, stress-induced  cardiomyopathy.    -Hypertension    -Anxiety    -alcohol abuse/use    -Gastric bypass    Past Medical History:   Diagnosis Date    Anxiety     Class 2 severe obesity with serious comorbidity and body mass index (BMI) of 36.0 to 36.9 in adult ([facility]) 2018    Hypoglycemia     NSTEMI (non-ST elevated myocardial infarction) ([facility]) 2024    Substance abuse ([facility])      Past Surgical History:   Procedure Laterality Date    BREAST RECONSTRUCTION Bilateral 2012    Procedure: WITH REPOSITIONING OF IMFRAMMARY FOLDS;  Surgeon: Hilario Wilhelm M.D.;  Location: [facility];  Service:     ABDOMINOPLASTY N/A 2012    Procedure: REVERSE ;  Surgeon: Hilario Wilhelm M.D.;  Location: [facility];  Service:     BREAST IMPLANT REVISION Bilateral 2012    Procedure: EXCHANGE TO LARGER SALINE IMPLANTS;  Surgeon: Hilario Wilhelm M.D.;  Location: [facility];  Service:     CAPSULECTOMY Bilateral 2012    Procedure: CAPSULECTOMY;  Surgeon: Hilario Wilhelm M.D.;  Location: [facility];  Service:     MO  DELIVERY ONLY      CHOLECYSTECTOMY      OTHER      cosmetic arms, legs, breast, stomach    OTHER      ablation (uterine)     Family History   Problem Relation Age of Onset    Heart Disease Mother     Allergies Mother     Arthritis Mother     Heart Disease Father     Allergies Father     Arthritis Father     Asthma Father     Diabetes Father     Diabetes Other     Stroke Other      Social History     Socioeconomic History    Marital status:      Spouse name: Not on file    Number of children: Not on file    Years of education: Not on file    Highest education level: Not on file   Occupational History    Not on file   Tobacco Use    Smoking status: Never    Smokeless tobacco: Never   Vaping Use    Vaping status: Never Used   Substance and Sexual Activity    Alcohol use: Yes     Comment: Occ    Drug use: No    Sexual activity: Not on file  "  Other Topics Concern    Not on file   Social History Narrative    Not on file     Social Drivers of Health     Financial Resource Strain: Not on file   Food Insecurity: Not on file   Transportation Needs: Not on file   Physical Activity: Not on file   Stress: Not on file   Social Connections: Not on file   Intimate Partner Violence: Not on file   Housing Stability: Not on file     Allergies   Allergen Reactions    Morphine Anaphylaxis    Bloodless     Codeine Vomiting     Burns stomach, and vomit      Outpatient Encounter Medications as of 4/14/2025   Medication Sig Dispense Refill    Dextrose, Diabetic Use, (GNP GLUCOSE GUMMIES PO) Take  by mouth as needed.      atorvastatin (LIPITOR) 40 MG Tab Take 1 Tablet by mouth every evening. 100 Tablet 3    enalapril (VASOTEC) 2.5 MG Tab Take 1 Tablet by mouth 2 times a day. 100 Tablet 3    cyanocobalamin (VITAMIN B-12) 1000 MCG/ML Solution Inject 1,000 mcg into the shoulder, thigh, or buttocks one time. WEEKLY      aspirin 81 MG EC tablet Take 1 Tablet by mouth every day. 100 Tablet 3    traZODone (DESYREL) 50 MG Tab Take 50-75 mg by mouth every evening.      Vitamin D, Ergocalciferol, 46404 units Cap Take 50,000 Units by mouth every Saturday.      [DISCONTINUED] clopidogrel (PLAVIX) 75 MG Tab Take 1 Tablet by mouth every day. 100 Tablet 2     No facility-administered encounter medications on file as of 4/14/2025.     Review of Systems   All other systems reviewed and are negative.  As per HPI.     Objective     /70 (BP Location: Left arm, Patient Position: Sitting, BP Cuff Size: Adult)   Pulse (!) 58   Resp 18   Ht 1.6 m (5' 3\")   Wt 97.3 kg (214 lb 9.6 oz)   SpO2 99%   BMI 38.01 kg/m²     Physical Exam  Vitals reviewed.   Constitutional:       Appearance: She is well-developed. She is obese.   HENT:      Head: Normocephalic and atraumatic.   Eyes:      Pupils: Pupils are equal, round, and reactive to light.   Neck:      Vascular: No JVD.   Cardiovascular:    "   Rate and Rhythm: Normal rate and regular rhythm.      Heart sounds: Normal heart sounds.   Pulmonary:      Effort: Pulmonary effort is normal. No respiratory distress.      Breath sounds: Normal breath sounds. No wheezing or rales.   Abdominal:      General: Bowel sounds are normal.      Palpations: Abdomen is soft.   Musculoskeletal:         General: Normal range of motion.      Cervical back: Normal range of motion and neck supple.      Right lower leg: No edema.      Left lower leg: No edema.   Skin:     General: Skin is warm and dry.   Neurological:      General: No focal deficit present.      Mental Status: She is alert and oriented to person, place, and time.   Psychiatric:         Behavior: Behavior normal.       Lab Results   Component Value Date/Time    CHOLSTRLTOT 165 03/20/2025 12:00 AM    LDL 78 03/20/2025 12:00 AM    HDL 76 03/20/2025 12:00 AM    TRIGLYCERIDE 51 03/20/2025 12:00 AM       Lab Results   Component Value Date/Time    SODIUM 139 10/21/2024 12:47 PM    POTASSIUM 4.3 10/21/2024 12:47 PM    CHLORIDE 104 10/21/2024 12:47 PM    CO2 25 10/21/2024 12:47 PM    GLUCOSE 80 10/21/2024 12:47 PM    BUN 17 10/21/2024 12:47 PM    CREATININE 0.87 10/21/2024 12:47 PM     Lab Results   Component Value Date/Time    ALKPHOSPHAT 106 (H) 03/06/2024 10:41 AM    ASTSGOT 74 (H) 03/06/2024 10:41 AM    ALTSGPT 47 03/06/2024 10:41 AM    TBILIRUBIN 1.5 03/06/2024 10:41 AM      Transthoracic Echo Report 3/6/2024  Poor endocardial definition due to body habitus even with use of echo contrast.  Assessment of left ventricular systolic function and wall motion is limited.  The left ventricular ejection fraction is visually estimated to be 40%, however, the accuracy of this estimate is limited.  There appears to be hypokinesis of the mid left ventricle with   relatively preserved wall motion at the base and apex.  Grade I diastolic function.  The right ventricle was not well-visualized, but appears grossly normal in size  and systolic function.  Estimated right ventricular systolic pressure is 40 mmHg.  Normal inferior vena cava size and inspiratory collapse.     Wall motion abnormalities were difficult to characterize due to   technical limitations, however, these appear to be in a nonvascular   distribution.  Consider variant stress-mediated (takotsubo)   cardiomyopathy or myocarditis.    Heart cath 3/6/2024  HEMODYNAMICS:  Aortic pressure: 111/85/98 mmHg  LVEDP: 30 mmHg  No significant aortic gradient on pullback     CORONARY ANGIOGRAPHY:  The left main coronary artery : Large in caliber normal-appearing vessel that bifurcates to LAD and left circumflex  The left anterior descending coronary artery : Normal-appearing large in caliber none transapical vessel that gives rise to 2 large diagonal branches.  The left circumflex coronary artery : Normal-appearing large in caliber vessel that gives rise to a large OM  The right coronary artery  : Large in caliber dominant vessel with RCA that reaches the apex.     IMPRESSION:  1.  Normal-appearing epicardial coronary arteries, dominant RCA, non-transapical LAD  2.  Consider either coronary vasospasm or stress-induced /Takotsubo related NSTEMI presentation  3.  Significantly elevated resting LVEDP 30 mmHg with no significant transaortic gradient on pullback.          NOTIFICATION:  The patient's family were notified of the results in the waiting area.    Transthoracic Echo Report 5/21/2024  Compared to the prior study on 03/06/24. Normalization of the LV systolic function, otherwise unchanged.  Normotensive during exam  Sinus rhythm/sinus bradycardia during exam  Normal left ventricular size, thickness, systolic function, and   diastolic function. Normal regional wall motion. The ejection fraction is measured to be 60-65%. Normal diastolic function.   No significant valvular disease      Date 6MWT ML[facility]   3/14/2024 359m in 6 minutes 7                       Assessment & Plan     1. Mixed  hyperlipidemia  Comp Metabolic Panel    LIPID PANEL      2. Heart failure, NYHA class 1 (HCC)        3. ACC/AHA stage B systolic heart failure (HCC)        4. NSTEMI (non-ST elevated myocardial infarction) (HCC)        5. Palpitations        6. Alcohol abuse            Medical Decision Making: Today's Assessment/Status/Plan:       Possible stress-induced cardiomyopathy, LVEF 60 to 65% improved from 40%, HFrEF, stage B, NYHA class I: Based on physical examination findings, patient is euvolemic. No JVD, lungs are clear to auscultation, no pitting edema in bilateral lower extremities, no ascites.  -Continue enalapril 2.5 mg twice a day  -Reinforced s/sx of worsening heart failure with patient and weight monitoring. Pt verbalizes understanding. Pt to call office or RTC if present.    -advised on alcohol cessation  -advised on regular exercise    NSTEMI  -No anginal symptom  -cardiac cath 3/6/2024 no obstructive CAD, but myocardial bridge  - Will stop Plavix and continue ASA 81 mg daily  - continue atorvastatin 40 mg daily  -Patient not able to participate in cardiac rehab as she lives out of town  -advised on regular exercise     HLD  -LDL 78 on 3/20/2025, goal below 70  -reports forgot to take statin for 2 months prior to labs on 3/20/2025 as grandson turned off her evening alarm to remind her of evening medications. She restarted taking atorvastatin 40 mg last week.  - Advised on adherence with medication regimen.  Will recheck lipids in 2 months.    Palpitations  --Resolved after cutting back on alcohol consumption    Alcohol abuse  - reports cutting back to 2 rum and coke a week from 2-3/night.  Her goal is quitting alcohol by next clinic visit.  - Advised to avoid alcohol given heart failure.    FU in clinic in 6 months. Sooner if needed.    Stephan Joyce, Cardiology NP  [facility], [facility]  [address] [place] [address], [address] [address]  Phone:  [phone]  Fax: [phone]     PLEASE NOTE: This Note was created using voice recognition Software. I have made every reasonable attempt to correct obvious errors, but I expect that there are errors of grammar and possibly content that I did not discover before finalizing the note

## 2025-07-23 LAB
CHOLEST SERPL-MCNC: 148 MG/DL
HDLC SERPL-MCNC: 68 MG/DL
LDLC SERPL CALC-MCNC: 68 MG/DL
TRIGL SERPL-MCNC: 58 MG/DL

## 2025-07-28 ENCOUNTER — RESULTS FOLLOW-UP (OUTPATIENT)
Dept: CARDIOLOGY | Facility: MEDICAL CENTER | Age: 65
End: 2025-07-28
Payer: MEDICARE

## 2025-07-28 DIAGNOSIS — E78.2 MIXED HYPERLIPIDEMIA: ICD-10-CM
